# Patient Record
Sex: FEMALE | Race: WHITE | NOT HISPANIC OR LATINO | Employment: FULL TIME | URBAN - METROPOLITAN AREA
[De-identification: names, ages, dates, MRNs, and addresses within clinical notes are randomized per-mention and may not be internally consistent; named-entity substitution may affect disease eponyms.]

---

## 2021-04-05 VITALS — WEIGHT: 150 LBS | HEIGHT: 59 IN | BODY MASS INDEX: 30.24 KG/M2

## 2021-04-05 DIAGNOSIS — Z12.11 ENCOUNTER FOR SCREENING COLONOSCOPY: Primary | ICD-10-CM

## 2021-04-05 DIAGNOSIS — Z83.71 FAMILY HISTORY OF POLYPS IN THE COLON: ICD-10-CM

## 2021-04-05 RX ORDER — ASPIRIN 81 MG/1
81 TABLET ORAL DAILY
COMMUNITY

## 2021-04-05 RX ORDER — SODIUM PICOSULFATE, MAGNESIUM OXIDE, AND ANHYDROUS CITRIC ACID 10; 3.5; 12 MG/160ML; G/160ML; G/160ML
LIQUID ORAL
Qty: 1 BOTTLE | Refills: 0 | Status: SHIPPED | OUTPATIENT
Start: 2021-04-05 | End: 2021-04-09 | Stop reason: HOSPADM

## 2021-04-05 RX ORDER — CITALOPRAM 10 MG/1
10 TABLET ORAL DAILY
COMMUNITY

## 2021-04-05 RX ORDER — MULTIVITAMIN
1 CAPSULE ORAL DAILY
COMMUNITY

## 2021-04-05 RX ORDER — LOSARTAN POTASSIUM 50 MG/1
50 TABLET ORAL DAILY
COMMUNITY

## 2021-04-05 RX ORDER — ATORVASTATIN CALCIUM 10 MG/1
10 TABLET, FILM COATED ORAL DAILY
COMMUNITY

## 2021-04-05 RX ORDER — PNV NO.95/FERROUS FUM/FOLIC AC 28MG-0.8MG
TABLET ORAL DAILY
COMMUNITY

## 2021-04-05 NOTE — TELEPHONE ENCOUNTER
Why does your doctor want you to have this procedure? Fam  Hx polyps    Do you have kidney disease?  no  If yes, are you on dialysis :     Have you had diverticulitis within the past 2 months? no    Are you diabetic?  no  If yes, insulin dependent: If yes, provide diabetic instructions sheet     Do take iron supplements? yes  If yes, instruct patient to hold iron supplement for 7 days prior    Are you on a blood thinner? no   Was the blood thinner sheet complete and faxed to cardiologist no  Plavix (clopidogrel), Coumadin (warfarin), Lovenox (enoxaparin), Xarelto (rivaroxaban), Pradaxa(dabigatran), Eliquis(apixaban) Savaysa/Lixiana (edoxapan)    Do you have an automatic implantable cardiac defibrillator (AICD)/pacemaker ([facility])? no  Was AICD/pacemaker sheet completed and faxed to cardiologist? no    Are you on home oxygen? no  If yes, continuous or nocturnal:     Have you been treated for MRSA, VRE or any communicable diseases? no    Heart attack, stroke, or stent within 3 months? no  Schedule at Hospital if within 3-6 months   Use nitroglycerin for chest pain in the last 6 months? no    History of organ  transplant?  no   If yes, notify Endo      History of neck/throat/tongue surgery or cancer? no  IF yes, notify Endo      Any problems with anesthesia in the past? no     Was stool C diff ordered?  no Stool specimen needs to be completed prior to procedure    Do have any facial or body piercings?no     Do you have a latex allergy? no     Do have an allergy to metals? (Bravo study only) no     If pediatric patient, was consent faxed to pediatrician no     Patient rights reviewed yes    Rx Clenpiq sent to provider for signature  Instructions emailed to patient

## 2021-04-09 ENCOUNTER — ANESTHESIA EVENT (OUTPATIENT)
Dept: GASTROENTEROLOGY | Facility: AMBULATORY SURGERY CENTER | Age: 51
End: 2021-04-09

## 2021-04-09 ENCOUNTER — ANESTHESIA (OUTPATIENT)
Dept: GASTROENTEROLOGY | Facility: AMBULATORY SURGERY CENTER | Age: 51
End: 2021-04-09

## 2021-04-09 ENCOUNTER — HOSPITAL ENCOUNTER (OUTPATIENT)
Dept: GASTROENTEROLOGY | Facility: AMBULATORY SURGERY CENTER | Age: 51
Discharge: HOME | End: 2021-04-09
Payer: COMMERCIAL

## 2021-04-09 VITALS
SYSTOLIC BLOOD PRESSURE: 91 MMHG | HEART RATE: 62 BPM | RESPIRATION RATE: 18 BRPM | DIASTOLIC BLOOD PRESSURE: 68 MMHG | TEMPERATURE: 98.1 F | OXYGEN SATURATION: 100 %

## 2021-04-09 DIAGNOSIS — Z83.71 FAMILY HISTORY OF COLONIC POLYPS: ICD-10-CM

## 2021-04-09 DIAGNOSIS — Z12.11 SCREENING FOR COLON CANCER: ICD-10-CM

## 2021-04-09 PROBLEM — E78.5 HYPERLIPIDEMIA: Status: ACTIVE | Noted: 2021-04-09

## 2021-04-09 PROBLEM — Z87.891 FORMER SMOKER: Status: ACTIVE | Noted: 2021-04-09

## 2021-04-09 PROBLEM — E03.9 HYPOTHYROIDISM: Status: ACTIVE | Noted: 2021-04-09

## 2021-04-09 PROBLEM — I10 HTN (HYPERTENSION): Status: ACTIVE | Noted: 2021-04-09

## 2021-04-09 LAB
EXT PREGNANCY TEST URINE: NEGATIVE
EXT. CONTROL: NORMAL

## 2021-04-09 PROCEDURE — G0105 COLORECTAL SCRN; HI RISK IND: HCPCS | Performed by: INTERNAL MEDICINE

## 2021-04-09 RX ORDER — FENTANYL CITRATE/PF 50 MCG/ML
12.5 SYRINGE (ML) INJECTION
Status: CANCELLED | OUTPATIENT
Start: 2021-04-09

## 2021-04-09 RX ORDER — LABETALOL 20 MG/4 ML (5 MG/ML) INTRAVENOUS SYRINGE
5
Status: CANCELLED | OUTPATIENT
Start: 2021-04-09

## 2021-04-09 RX ORDER — METOCLOPRAMIDE HYDROCHLORIDE 5 MG/ML
10 INJECTION INTRAMUSCULAR; INTRAVENOUS ONCE AS NEEDED
Status: CANCELLED | OUTPATIENT
Start: 2021-04-09

## 2021-04-09 RX ORDER — ONDANSETRON 2 MG/ML
4 INJECTION INTRAMUSCULAR; INTRAVENOUS ONCE AS NEEDED
Status: CANCELLED | OUTPATIENT
Start: 2021-04-09

## 2021-04-09 RX ORDER — SODIUM CHLORIDE 9 MG/ML
50 INJECTION, SOLUTION INTRAVENOUS CONTINUOUS
Status: DISCONTINUED | OUTPATIENT
Start: 2021-04-09 | End: 2021-04-13 | Stop reason: HOSPADM

## 2021-04-09 RX ORDER — HYDRALAZINE HYDROCHLORIDE 20 MG/ML
5 INJECTION INTRAMUSCULAR; INTRAVENOUS
Status: CANCELLED | OUTPATIENT
Start: 2021-04-09

## 2021-04-09 RX ORDER — PROPOFOL 10 MG/ML
INJECTION, EMULSION INTRAVENOUS AS NEEDED
Status: DISCONTINUED | OUTPATIENT
Start: 2021-04-09 | End: 2021-04-09

## 2021-04-09 RX ORDER — PROMETHAZINE HYDROCHLORIDE 25 MG/ML
6.25 INJECTION, SOLUTION INTRAMUSCULAR; INTRAVENOUS ONCE AS NEEDED
Status: CANCELLED | OUTPATIENT
Start: 2021-04-09

## 2021-04-09 RX ORDER — MEPERIDINE HYDROCHLORIDE 50 MG/ML
12.5 INJECTION INTRAMUSCULAR; INTRAVENOUS; SUBCUTANEOUS
Status: CANCELLED | OUTPATIENT
Start: 2021-04-09

## 2021-04-09 RX ADMIN — PROPOFOL 20 MG: 10 INJECTION, EMULSION INTRAVENOUS at 09:48

## 2021-04-09 RX ADMIN — PROPOFOL 20 MG: 10 INJECTION, EMULSION INTRAVENOUS at 09:40

## 2021-04-09 RX ADMIN — PROPOFOL 80 MG: 10 INJECTION, EMULSION INTRAVENOUS at 09:35

## 2021-04-09 RX ADMIN — PROPOFOL 20 MG: 10 INJECTION, EMULSION INTRAVENOUS at 09:51

## 2021-04-09 RX ADMIN — PROPOFOL 20 MG: 10 INJECTION, EMULSION INTRAVENOUS at 09:45

## 2021-04-09 RX ADMIN — PROPOFOL 20 MG: 10 INJECTION, EMULSION INTRAVENOUS at 09:42

## 2021-04-09 RX ADMIN — SODIUM CHLORIDE 50 ML/HR: 9 INJECTION, SOLUTION INTRAVENOUS at 09:23

## 2021-04-09 RX ADMIN — PROPOFOL 20 MG: 10 INJECTION, EMULSION INTRAVENOUS at 09:38

## 2021-04-09 NOTE — ANESTHESIA PREPROCEDURE EVALUATION
Procedure:  COLONOSCOPY    Relevant Problems   ANESTHESIA (within normal limits)      CARDIO   (+) HTN (hypertension)   (+) Hyperlipidemia      ENDO   (+) Hypothyroidism      GI/HEPATIC (within normal limits)      /RENAL (within normal limits)      GYN (within normal limits)      HEMATOLOGY (within normal limits)      MUSCULOSKELETAL (within normal limits)      NEURO/PSYCH (within normal limits)      PULMONARY (within normal limits)      Other   (+) Former smoker        Physical Exam    Airway    Mallampati score: II  TM Distance: >3 FB  Neck ROM: full     Dental   No notable dental hx     Cardiovascular  Rhythm: regular, Rate: normal, Cardiovascular exam normal    Pulmonary  Pulmonary exam normal Breath sounds clear to auscultation,     Other Findings        Anesthesia Plan  ASA Score- 2     Anesthesia Type- IV sedation with anesthesia with ASA Monitors  Additional Monitors:   Airway Plan:           Plan Factors-Exercise tolerance (METS): >4 METS  Chart reviewed  Patient summary reviewed  Patient is not a current smoker  Induction-     Postoperative Plan-     Informed Consent- Anesthetic plan and risks discussed with patient

## 2021-04-09 NOTE — ANESTHESIA POSTPROCEDURE EVALUATION
Post-Op Assessment Note    CV Status:  Stable  Pain Score: 0    Pain management: adequate     Mental Status:  Sleepy   Hydration Status:  Stable   PONV Controlled:  None   Airway Patency:  Patent and adequate      Post Op Vitals Reviewed: Yes      Staff: Anesthesiologist         No complications documented      BP      Temp      Pulse     Resp      SpO2

## 2021-04-09 NOTE — H&P
History and Physical -  [facility]  Jignesh He 49 y o  female MRN: [id number]                  HPI: Jignesh He is a 49y o  year old female who presents for colon cancer screening  Father with colon polyps  REVIEW OF SYSTEMS: Per the HPI, and otherwise unremarkable  Historical Information   Past Medical History:   Diagnosis Date    Hashimoto's disease     Hyperlipidemia     Hypertension      Past Surgical History:   Procedure Laterality Date     SECTION      CHOLECYSTECTOMY      TONSILLECTOMY      TRIGGER FINGER RELEASE Right      Social History   Social History     Substance and Sexual Activity   Alcohol Use Not Currently     Social History     Substance and Sexual Activity   Drug Use Never     Social History     Tobacco Use   Smoking Status Former Smoker   Smokeless Tobacco Never Used     Family History   Problem Relation Age of Onset    Hypertension Mother     Hyperlipidemia Mother     Colon polyps Father     Diabetes Father     Hypertension Father     Hyperlipidemia Father     Colon cancer Neg Hx        Meds/Allergies     Current Outpatient Medications:     aspirin (ECOTRIN LOW STRENGTH) 81 mg EC tablet    atorvastatin (LIPITOR) 10 mg tablet    citalopram (CeleXA) 10 mg tablet    Ferrous Sulfate (Iron) 325 (65 Fe) MG TABS    losartan (COZAAR) 50 mg tablet    Multiple Vitamin (multivitamin) capsule    Sod Picosulfate-Mag Ox-Cit Acd (Clenpiq) 10-3 5-12 MG-GM -GM/160ML SOLN    Current Facility-Administered Medications:     sodium chloride 0 9 % infusion, 50 mL/hr, Intravenous, Continuous, Continue from Pre-op at 21 0926    Allergies   Allergen Reactions    Sulfa Antibiotics Rash       Objective     /68   Pulse 71   Temp 98 1 °F (36 7 °C) (Temporal)   Resp 16   SpO2 96%       PHYSICAL EXAM    Gen: NAD AAOx3  CV: S1S2 RRR no m/r/g  CHEST: Clear b/l no c/r/w  ABD: +BS soft, NT/ND  EXT: no edema      ASSESSMENT/PLAN:  This is a 49 y o  year old female here for colonoscopy, and she is stable and optimized for her procedure

## 2021-04-09 NOTE — DISCHARGE INSTRUCTIONS
Hemorrhoids   WHAT YOU NEED TO KNOW:   What are hemorrhoids? Hemorrhoids are swollen blood vessels inside your rectum (internal hemorrhoids) or on your anus (external hemorrhoids)  Sometimes a hemorrhoid may prolapse  This means it extends out of your anus  What increases my risk for hemorrhoids? · Pregnancy or obesity    · Straining or sitting for a long time during bowel movements    · Liver disease    · Weak muscles around the anus caused by older age, rectal surgery, or anal intercourse    · A lack of physical activity    · Chronic diarrhea or constipation    · A low-fiber diet    What are the signs and symptoms of hemorrhoids? · Pain or itching around your anus or inside your rectum    · Swelling or bumps around your anus    · Bright red blood in your bowel movement, on the toilet paper, or in the toilet bowl    · Tissue bulging out of your anus (prolapsed hemorrhoids)    · Incontinence (poor control over urine or bowel movements)    How are hemorrhoids diagnosed? Your healthcare provider will ask about your symptoms, the foods you eat, and your bowel movements  He or she will examine your anus for external hemorrhoids  You may need the following:  · A digital rectal exam  is a test to check for hemorrhoids  Your healthcare provider will put a gloved finger inside your anus to feel for the hemorrhoids  · An anoscopy  is a test that uses a scope (small tube with a light and camera on the end) to look at your hemorrhoids  How are hemorrhoids treated? Treatment will depend on your symptoms  You may need any of the following:  · Medicines  can help decrease pain and swelling, and soften your bowel movement  The medicine may be a pill, pad, cream, or ointment  · Procedures  may be used to shrink or remove your hemorrhoid  Examples include rubber-band ligation, sclerotherapy, and photocoagulation  These procedures may be done in your healthcare provider's office   Ask your healthcare provider for more information about these procedures  · Surgery  may be needed to shrink or remove your hemorrhoids  How can I manage my symptoms? · Apply ice on your anus for 15 to 20 minutes every hour or as directed  Use an ice pack, or put crushed ice in a plastic bag  Cover it with a towel before you apply it to your anus  Ice helps prevent tissue damage and decreases swelling and pain  · Take a sitz bath  Fill a bathtub with 4 to 6 inches of warm water  You may also use a sitz bath pan that fits inside a toilet bowl  Sit in the sitz bath for 15 minutes  Do this 3 times a day, and after each bowel movement  The warm water can help decrease pain and swelling  · Keep your anal area clean  Gently wash the area with warm water daily  Soap may irritate the area  After a bowel movement, wipe with moist towelettes or wet toilet paper  Dry toilet paper can irritate the area  How can I help prevent hemorrhoids? · Do not strain to have a bowel movement  Do not sit on the toilet too long  These actions can increase pressure on the tissues in your rectum and anus  · Drink plenty of liquids  Liquids can help prevent constipation  Ask how much liquid to drink each day and which liquids are best for you  · Eat a variety of high-fiber foods  Examples include fruits, vegetables, and whole grains  Ask your healthcare provider how much fiber you need each day  You may need to take a fiber supplement  · Exercise as directed  Exercise, such as walking, may make it easier to have a bowel movement  Ask your healthcare provider to help you create an exercise plan  · Do not have anal sex  Anal sex can weaken the skin around your rectum and anus  · Avoid heavy lifting  This can cause straining and increase your risk for another hemorrhoid  When should I seek immediate care? · You have severe pain in your rectum or around your anus      · You have severe pain in your abdomen and you are vomiting  · You have bleeding from your anus that soaks through your underwear  When should I contact my healthcare provider? · You have frequent and painful bowel movements  · Your hemorrhoid looks or feels more swollen than usual      · You do not have a bowel movement for 2 days or more  · You see or feel tissue coming through your anus  · You have questions or concerns about your condition or care  CARE AGREEMENT:   You have the right to help plan your care  Learn about your health condition and how it may be treated  Discuss treatment options with your healthcare providers to decide what care you want to receive  You always have the right to refuse treatment  The above information is an  only  It is not intended as medical advice for individual conditions or treatments  Talk to your doctor, nurse or pharmacist before following any medical regimen to see if it is safe and effective for you  © Copyright [address] Information is for End User's use only and may not be sold, redistributed or otherwise used for commercial purposes   All illustrations and images included in CareNotes® are the copyrighted property of A D A M , Inc  or [address] [place] [address]

## 2021-04-12 DIAGNOSIS — Z23 ENCOUNTER FOR IMMUNIZATION: ICD-10-CM

## 2022-03-22 ENCOUNTER — HOSPITAL ENCOUNTER (OUTPATIENT)
Dept: CT IMAGING | Facility: HOSPITAL | Age: 52
Discharge: HOME | End: 2022-03-22

## 2022-03-22 DIAGNOSIS — I10 ESSENTIAL (PRIMARY) HYPERTENSION: ICD-10-CM

## 2022-03-22 DIAGNOSIS — E78.00 PURE HYPERCHOLESTEROLEMIA, UNSPECIFIED: ICD-10-CM

## 2022-03-22 PROCEDURE — G1004 CDSM NDSC: HCPCS

## 2022-03-22 PROCEDURE — 75571 CT HRT W/O DYE W/CA TEST: CPT

## 2022-04-05 NOTE — PROGRESS NOTES
Randa Moreau is a  52 y.o. No obstetric history on file. for annual exam.  She has irregular menses  with No LMP recorded.    The patient has no complaints   She uses nfp for birth control  She performs self breast exams with no concerns     Review of Systems  Constitutional: Negative  ENMT: Negative  Eyes: Negative  Respiratory: Negative  Cardio: Negative  GI:Negative  :Negative  Neuro: Negative  Psych: Negative  Integumentary: Negative  MS:Negative  Heme/Lymph: Negative  Reproductive: Negative    OB History:   Cs x 2     Medical History/GYN History No past medical history on file.    Surgical History: cs x 2     Social History:   Social History     Socioeconomic History   • Marital status:         Family History: No family history on file.    Allergies: sulfa rash    Prior to Admission medications    Not on File           Objective     Physical Exam   Constitutional: She is oriented to person, place, and time. She appears well-developed and well-nourished.   HEENT:   Head: Normocephalic and atraumatic.   Eyes: EOM are normal. Pupils are equal, round, and reactive to light.   Neck: Normal range of motion. No tracheal deviation present. No thyromegaly present.   Abdominal: Soft. Bowel sounds are normal. She exhibits no distension and no masses. There is no tenderness. There is no rebound and no guarding.   Musculoskeletal: Normal range of motion. She exhibits no edema.   Lymphadenopathy:     She has no cervical adenopathy.   Neurological: She is alert and oriented to person, place, and time. No cranial nerve deficit.   Skin: Skin is warm and dry.   Psychiatric: She has a normal mood and affect.     BREAST: no masses or nodes palpated b/l    PELVIC:  Genitourinary: Vagina normal.   External female genitalia normal.   Normal bladder.   Vagina normal. No vaginal discharge found.   Cervix exam normal.Cervix does not exhibit motion tenderness or discharge. Uterus is normal size . Uterine contour is regular.    Adnexa normal. Right adnexum does not display tenderness, does not display fullness and palpable. Left adnexum does not display tenderness, does not display fullness and palpable.   Nursing note and vitals reviewed.    A/P:  Annual: Pap  Self breast exam taught  Screening mammo annually at 41  Contraceptive counseling  Screening Dexa at 51  Screening Colonoscopy at 46  Cholesterol and TFT screening with Primary MD  Preconception counseling  The following counseling was provided: Diet and Exercise: Smoking Cessation; Drug/Alcohol Abuse/ HIV and Safe Sex/ Domestic Violence/ Vitamin Supplementation   21 yr willi  and ash going to college in 6 mo and janet doing well has ocd pt did colon and mammo neg but due for mammo on gluten free diet has hashimotos but no meds yet  Menses still coming q22 but not ev month skipped 5 mo in past likely perimp  Had endobx last mo and us all neg for vb q15 after covid vaccine.

## 2022-04-07 ENCOUNTER — OFFICE VISIT (OUTPATIENT)
Dept: OBSTETRICS AND GYNECOLOGY | Facility: CLINIC | Age: 52
End: 2022-04-07
Payer: COMMERCIAL

## 2022-04-07 VITALS
SYSTOLIC BLOOD PRESSURE: 116 MMHG | DIASTOLIC BLOOD PRESSURE: 74 MMHG | WEIGHT: 169 LBS | HEIGHT: 59 IN | BODY MASS INDEX: 34.07 KG/M2

## 2022-04-07 DIAGNOSIS — Z01.419 WELL WOMAN EXAM WITH ROUTINE GYNECOLOGICAL EXAM: Primary | ICD-10-CM

## 2022-04-07 PROBLEM — E78.5 HYPERLIPIDEMIA: Status: ACTIVE | Noted: 2021-04-09

## 2022-04-07 PROBLEM — E03.9 HYPOTHYROIDISM: Status: ACTIVE | Noted: 2021-04-09

## 2022-04-07 PROBLEM — I10 HTN (HYPERTENSION): Status: ACTIVE | Noted: 2021-04-09

## 2022-04-07 PROCEDURE — 3074F SYST BP LT 130 MM HG: CPT | Performed by: OBSTETRICS & GYNECOLOGY

## 2022-04-07 PROCEDURE — 99396 PREV VISIT EST AGE 40-64: CPT | Performed by: OBSTETRICS & GYNECOLOGY

## 2022-04-07 PROCEDURE — 3008F BODY MASS INDEX DOCD: CPT | Performed by: OBSTETRICS & GYNECOLOGY

## 2022-04-07 PROCEDURE — 3078F DIAST BP <80 MM HG: CPT | Performed by: OBSTETRICS & GYNECOLOGY

## 2022-04-07 RX ORDER — ATORVASTATIN CALCIUM 10 MG/1
10 TABLET, FILM COATED ORAL DAILY
COMMUNITY
End: 2024-03-21

## 2022-04-07 RX ORDER — LOSARTAN POTASSIUM 25 MG/1
25 TABLET ORAL NIGHTLY
COMMUNITY

## 2022-04-07 RX ORDER — SPIRONOLACTONE 50 MG/1
TABLET, FILM COATED ORAL
COMMUNITY
Start: 2022-01-27 | End: 2024-03-21

## 2022-04-07 RX ORDER — ASPIRIN 81 MG/1
81 TABLET ORAL DAILY
COMMUNITY
End: 2024-03-21

## 2022-04-07 RX ORDER — FERROUS SULFATE 325(65) MG
65 TABLET ORAL DAILY
COMMUNITY

## 2022-04-12 LAB
CYTOLOGIST CVX/VAG CYTO: NORMAL
CYTOLOGY CVX/VAG DOC CYTO: NORMAL
CYTOLOGY CVX/VAG DOC THIN PREP: NORMAL
DX ICD CODE: NORMAL
HPV I/H RISK 4 DNA CVX QL PROBE+SIG AMP: NEGATIVE
LAB CORP NOTE:: NORMAL
OTHER STN SPEC: NORMAL
STAT OF ADQ CVX/VAG CYTO-IMP: NORMAL

## 2023-05-09 ENCOUNTER — OFFICE VISIT (OUTPATIENT)
Dept: OBSTETRICS AND GYNECOLOGY | Facility: CLINIC | Age: 53
End: 2023-05-09
Payer: COMMERCIAL

## 2023-05-09 VITALS
DIASTOLIC BLOOD PRESSURE: 71 MMHG | HEIGHT: 59 IN | SYSTOLIC BLOOD PRESSURE: 120 MMHG | BODY MASS INDEX: 30.92 KG/M2 | WEIGHT: 153.4 LBS

## 2023-05-09 DIAGNOSIS — Z01.419 WELL WOMAN EXAM WITH ROUTINE GYNECOLOGICAL EXAM: Primary | ICD-10-CM

## 2023-05-09 DIAGNOSIS — N63.0 MASS OF BREAST, UNSPECIFIED LATERALITY: ICD-10-CM

## 2023-05-09 DIAGNOSIS — Z12.31 VISIT FOR SCREENING MAMMOGRAM: ICD-10-CM

## 2023-05-09 PROCEDURE — 3078F DIAST BP <80 MM HG: CPT | Performed by: OBSTETRICS & GYNECOLOGY

## 2023-05-09 PROCEDURE — 99396 PREV VISIT EST AGE 40-64: CPT | Performed by: OBSTETRICS & GYNECOLOGY

## 2023-05-09 PROCEDURE — 3074F SYST BP LT 130 MM HG: CPT | Performed by: OBSTETRICS & GYNECOLOGY

## 2023-05-09 PROCEDURE — 3008F BODY MASS INDEX DOCD: CPT | Performed by: OBSTETRICS & GYNECOLOGY

## 2023-05-09 RX ORDER — METOPROLOL TARTRATE 50 MG/1
50 TABLET ORAL 2 TIMES DAILY
Qty: 180 TABLET | Refills: 1 | Status: SHIPPED | OUTPATIENT
Start: 2023-05-09 | End: 2024-03-21

## 2023-05-09 RX ORDER — ACETAMINOPHEN 500 MG
TABLET ORAL
COMMUNITY
End: 2024-04-04

## 2023-05-09 RX ORDER — ZINC GLUCONATE 50 MG
500 TABLET ORAL
COMMUNITY
End: 2024-12-06 | Stop reason: ALTCHOICE

## 2023-05-09 NOTE — PROGRESS NOTES
Randa Moreau is a  53 y.o.  for annual exam.  She has irregular menses  with No LMP recorded.    The patient has no complaints   She uses nfp for birth control  She performs self breast exams with no concerns     Review of Systems  Constitutional: Negative  ENMT: Negative  Eyes: Negative  Respiratory: Negative  Cardio: Negative  GI:Negative  :Negative  Neuro: Negative  Psych: Negative  Integumentary: Negative  MS:Negative  Heme/Lymph: Negative  Reproductive: Negative    OB History:   OB History    Para Term  AB Living   3 2 2 0 1 2   SAB IAB Ectopic Multiple Live Births   1 0 0 0 2      # Outcome Date GA Lbr Alon/2nd Weight Sex Delivery Anes PTL Lv   3 Term 05    F CS-LTranv   KATHIE   2 Term 04    M CS-LTranv   KATHIE   1 SAB                Medical History/GYN History   Past Medical History:   Diagnosis Date   • Hypercholesteremia    • Hypertension    • Hypothyroidism    • PCOS (polycystic ovarian syndrome)        Surgical History:   Past Surgical History:   Procedure Laterality Date   •  SECTION     • CHOLECYSTECTOMY     • TONSILLECTOMY         Social History:   Social History     Socioeconomic History   • Marital status:      Spouse name: None   • Number of children: None   • Years of education: None   • Highest education level: None   Tobacco Use   • Smoking status: Former   • Smokeless tobacco: Never   Vaping Use   • Vaping status: Never Used   Substance and Sexual Activity   • Alcohol use: Yes     Comment: social   • Drug use: Never   • Sexual activity: Yes     Partners: Male        Family History:   Family History   Problem Relation Age of Onset   • Diabetes Biological Father    • Hypertension Biological Father    • Thyroid disease Biological Father    • Hypertension Biological Mother    • Hyperlipidemia Biological Mother    • Breast cancer Father's Sister    • Colon cancer Neg Hx        Allergies: Sulfa (sulfonamide antibiotics)    Prior to Admission medications     Medication Sig Start Date End Date Taking? Authorizing Provider   aspirin 81 mg enteric coated tablet Take 81 mg by mouth daily.    Erik King MD   atorvastatin (LIPITOR) 10 mg tablet Take 10 mg by mouth daily.    Erik King MD   cholecalciferol, vitamin D3, 50 mcg (2,000 unit) capsule 1 tablet Orally qod    Anshul King MD   ferrous sulfate 325 mg (65 mg iron) tablet Take by mouth daily.    Erik King MD   fsh/flx/prim/cur/bor/om3,6,9 5 (OMEGA 3-6-9 FATTY ACIDS ORAL) as directed Orally    Anshul King MD   iron,carb/vit C/vit B12/folic (IRON 100 PLUS ORAL) 1 tab Oral    Anshul King MD   losartan (COZAAR) 50 mg tablet Take 50 mg by mouth daily.    Erik King MD   magnesium oxide 500 mg capsule daily.    Anshul King MD   MOUNJARO 7.5 mg/0.5 mL pen injector USE AS DIRECTED SUBCUTANEOUSLY ONCE A WEEK FOR 28 DAYS 4/15/23   Anshul King MD   spironolactone (ALDACTONE) 50 mg tablet  1/27/22   Erik King MD           Objective     Physical Exam   Constitutional: She is oriented to person, place, and time. She appears well-developed and well-nourished.   HEENT:   Head: Normocephalic and atraumatic.   Eyes: EOM are normal. Pupils are equal, round, and reactive to light.   Neck: Normal range of motion. No tracheal deviation present. No thyromegaly present.   Abdominal: Soft. Bowel sounds are normal. She exhibits no distension and no masses. There is no tenderness. There is no rebound and no guarding.   Musculoskeletal: Normal range of motion. She exhibits no edema.   Lymphadenopathy:     She has no cervical adenopathy.   Neurological: She is alert and oriented to person, place, and time. No cranial nerve deficit.   Skin: Skin is warm and dry.   Psychiatric: She has a normal mood and affect.     BREAST: no masses or nodes palpated b/l    PELVIC:  Genitourinary: Vagina normal.   External female genitalia  normal.   Normal bladder.   Vagina normal. No vaginal discharge found.   Cervix exam normal.Cervix does not exhibit motion tenderness or discharge. Uterus is normal size . Uterine contour is regular.   Adnexa normal. Right adnexum does not display tenderness, does not display fullness and palpable. Left adnexum does not display tenderness, does not display fullness and palpable.   Nursing note and vitals reviewed.    A/P: still getting menses monthly on injectables for wt loss   20+ yr willi aleman in [facility] with her brother there too  janet going to [employer] for college  Now chol better off meds mom and dad live close  Due for mammo  Cystic in [place]  Will get us there too  metoprolol for shakes palp  From injectables.  Works and travels all the time Annual: Pap done  Self breast exam taught  Screening mammo annually at 41  Contraceptive counseling  Screening Dexa at 66 or 11 yr   Screening Colonoscopy at 45 to 50  Cholesterol and TFT screening with Primary MD  Preconception counseling as needed  The following counseling was provided: Diet and Exercise: Smoking Cessation; Drug/Alcohol Abuse/ HIV and Safe Sex/ Domestic Violence/ Vitamin Supplementation vit d 1-2000 daily   Gardisil advised for pt if under 27 and not completed series as of visit

## 2023-06-14 DIAGNOSIS — N63.0 MASS OF BREAST, UNSPECIFIED LATERALITY: Primary | ICD-10-CM

## 2023-06-14 NOTE — PROGRESS NOTES
Patient called in stating she tried to schedule U/S at [facility] but was told she needs rx for mammogram to be changed to diagnostic    New order emailed to patient   
(3) no apparent problem

## 2024-03-21 ENCOUNTER — OFFICE VISIT (OUTPATIENT)
Dept: OBSTETRICS AND GYNECOLOGY | Facility: CLINIC | Age: 54
End: 2024-03-21
Payer: COMMERCIAL

## 2024-03-21 VITALS
OXYGEN SATURATION: 98 % | HEART RATE: 75 BPM | SYSTOLIC BLOOD PRESSURE: 120 MMHG | BODY MASS INDEX: 28.39 KG/M2 | DIASTOLIC BLOOD PRESSURE: 82 MMHG | HEIGHT: 59 IN | WEIGHT: 140.8 LBS

## 2024-03-21 DIAGNOSIS — N93.9 ABNORMAL UTERINE BLEEDING (AUB): Primary | ICD-10-CM

## 2024-03-21 DIAGNOSIS — Z12.31 SCREENING MAMMOGRAM FOR BREAST CANCER: ICD-10-CM

## 2024-03-21 PROCEDURE — 99999 PR OFFICE/OUTPT VISIT,PROCEDURE ONLY: CPT | Performed by: OBSTETRICS & GYNECOLOGY

## 2024-03-21 PROCEDURE — 58100 BIOPSY OF UTERUS LINING: CPT | Performed by: OBSTETRICS & GYNECOLOGY

## 2024-03-21 RX ORDER — METOPROLOL SUCCINATE 50 MG/1
25 TABLET, EXTENDED RELEASE ORAL DAILY
COMMUNITY
Start: 2024-02-14

## 2024-03-21 NOTE — PROCEDURES
Endometrial Ablation    Date/Time: 3/21/2024 3:01 PM    Performed by: Sindy Raza DO  Authorized by: Sindy Raza DO    Consent:     Consent obtained:  Verbal    Consent given by:  Patient    Procedural risks discussed:  Bleeding, infection, failure rate, death, damage to other organs and repeat procedure    Patient questions answered: yes      Patient agrees, verbalizes understanding, and wants to proceed: yes    Indication:     Indications: Excessive or frequent menstruation and Other disorder of menstruation and other abnormal bleeding from female genital tract    Pre-procedure:    Local anesthetic:     Total amount used (mL):  0  Procedure:     Cervix cleaned and prepped: yes      Tenaculum applied to cervix: yes      Uterus sound depth (cm):  8    Cervix dilated: yes    Post-procedure:     No complications: no      Estimated blood loss (mL):  0  Comments:      No complications.

## 2024-03-21 NOTE — PROGRESS NOTES
Patient ID: Randa Moreau   : 1970 54 y.o.  MRN: [id number]   Visit Date: 2024    Subjective   Randa Moreau is presenting today for Biopsy      HPI  No LMP recorded.      Pt here for AUB. Pt still getting menses but states she has been bleeding light for the past 2 weeks. Pt admits to some bloating but denies pelvic pain.         Past Medical History:  has a past medical history of Hypercholesteremia, Hypertension, Hypothyroidism, and PCOS (polycystic ovarian syndrome).     Past Surgical History:  has a past surgical history that includes  section; Tonsillectomy; and Cholecystectomy.    Obstetric History:   OB History    Para Term  AB Living   3 2 2   1 2   SAB IAB Ectopic Multiple Live Births   1       2      # Outcome Date GA Lbr Alon/2nd Weight Sex Delivery Anes PTL Lv   3 Term 05    F CS-LTranv   KATHIE   2 Term 04    M CS-LTranv   KATHIE   1 SAB                Family History: family history includes Breast cancer in her father's sister; Diabetes in her biological father; Hyperlipidemia in her biological mother; Hypertension in her biological father and biological mother; Thyroid disease in her biological father.    Medications:   Current Outpatient Medications:   •  cholecalciferol, vitamin D3, 50 mcg (2,000 unit) capsule, 1 tablet Orally qod, Disp: , Rfl:   •  ferrous sulfate 325 mg (65 mg iron) tablet, Take by mouth daily., Disp: , Rfl:   •  fsh/flx/prim/cur/bor/om3,6,9 5 (OMEGA 3-6-9 FATTY ACIDS ORAL), as directed Orally, Disp: , Rfl:   •  iron,carb/vit C/vit B12/folic (IRON 100 PLUS ORAL), 1 tab Oral, Disp: , Rfl:   •  losartan (COZAAR) 25 mg tablet, Take 25 mg by mouth daily., Disp: , Rfl:   •  magnesium oxide 500 mg capsule, daily., Disp: , Rfl:   •  metoprolol succinate XL (TOPROL-XL) 50 mg 24 hr tablet, , Disp: , Rfl:   •  tirzepatide (MOUNJARO) 12.5 mg/0.5 mL pen injector, , Disp: , Rfl:   •  aspirin 81 mg enteric coated tablet, Take 81 mg by mouth daily.,  "Disp: , Rfl:   •  atorvastatin (LIPITOR) 10 mg tablet, Take 10 mg by mouth daily., Disp: , Rfl:   •  metoprolol tartrate (LOPRESSOR) 50 mg tablet, Take 1 tablet (50 mg total) by mouth 2 (two) times a day., Disp: 180 tablet, Rfl: 1  •  spironolactone (ALDACTONE) 50 mg tablet, , Disp: , Rfl:       Allergies: is allergic to lisinopril and sulfa (sulfonamide antibiotics).       Vital Signs for this encounter:   Visit Vitals  /82 (BP Location: Right upper arm, Patient Position: Sitting)   Pulse 75   Ht 1.499 m (4' 11\")   Wt 63.9 kg (140 lb 12.8 oz)   SpO2 98%   BMI 28.44 kg/m²       Review of Systems    General Appearance: Alert, cooperative, no acute distress  Head: Normocephalic, without obvious abnormality  Breast: Deferred  Abdomen: Soft, nontender, nondistended,no masses, no organomegaly  Pelvic exam: VULVA: normal appearing vulva with no masses, tenderness or lesions, VAGINA: normal appearing vagina with normal color and discharge, no lesions, CERVIX: scant blood in vault normal appearing cervix without discharge or lesions, UTERUS: uterus is normal size, shape, consistency and nontender, ADNEXA: normal adnexa in size, nontender and no masses.  Extremities: no edema      Impression/Plan:    54 y.o. is presenting today for Biopsy    1. Abnormal uterine bleeding (AUB)  Pt tolerated procedure well. Will send for pelvic ultrasound and will call patient with results.   - Biopsy endometrial  - BI SCREENING MAMMOGRAM BILATERAL(TOMOSYNTHESIS); Future  - US PELVIS TRANSABDOMINAL & TRANSVAGINAL; Future  - Pathology Tissue Exam    2. Screening mammogram for breast cancer    - Biopsy endometrial  - BI SCREENING MAMMOGRAM BILATERAL(TOMOSYNTHESIS); Future  - US PELVIS TRANSABDOMINAL & TRANSVAGINAL; Future  - Pathology Tissue Exam             Sindy Raza, DO  "

## 2024-03-28 ENCOUNTER — TELEPHONE (OUTPATIENT)
Dept: OBSTETRICS AND GYNECOLOGY | Facility: CLINIC | Age: 54
End: 2024-03-28
Payer: COMMERCIAL

## 2024-03-28 DIAGNOSIS — N93.9 ABNORMAL UTERINE BLEEDING (AUB): ICD-10-CM

## 2024-03-28 DIAGNOSIS — Z12.31 SCREENING MAMMOGRAM FOR BREAST CANCER: ICD-10-CM

## 2024-03-28 LAB
DX ICD CODE: NORMAL
DX ICD CODE: NORMAL
PATH REPORT.COMMENTS IMP SPEC: NORMAL
PATH REPORT.FINAL DX SPEC: NORMAL
PATH REPORT.GROSS SPEC: NORMAL
PATH REPORT.SITE OF ORIGIN SPEC: NORMAL
PATHOLOGIST NAME: NORMAL
PAYMENT PROCEDURE: NORMAL

## 2024-03-28 NOTE — TELEPHONE ENCOUNTER
Patient contacted me to let me see the results of her ultrasound and is asking for her pathology results her ultrasound shows a possible fundal polyp up to 1 cm I explained this would likely need to be removed through a D&C hysteroscopy we will get the actual report onto the chart and await her pathology from her office endometrial biopsy and then make a decision after that

## 2024-04-02 ENCOUNTER — TELEPHONE (OUTPATIENT)
Dept: OBSTETRICS AND GYNECOLOGY | Facility: CLINIC | Age: 54
End: 2024-04-02
Payer: COMMERCIAL

## 2024-04-04 ENCOUNTER — PRE-ADMISSION TESTING (OUTPATIENT)
Dept: PREADMISSION TESTING | Age: 54
End: 2024-04-04
Payer: COMMERCIAL

## 2024-04-04 ENCOUNTER — PREP FOR CASE (OUTPATIENT)
Dept: OBSTETRICS AND GYNECOLOGY | Facility: CLINIC | Age: 54
End: 2024-04-04
Payer: COMMERCIAL

## 2024-04-04 ENCOUNTER — DOCUMENTATION (OUTPATIENT)
Dept: OBSTETRICS AND GYNECOLOGY | Facility: CLINIC | Age: 54
End: 2024-04-04
Payer: COMMERCIAL

## 2024-04-04 VITALS — WEIGHT: 135 LBS | HEIGHT: 59 IN | BODY MASS INDEX: 27.21 KG/M2

## 2024-04-04 DIAGNOSIS — Z01.818 ENCOUNTER FOR PREADMISSION TESTING: Primary | ICD-10-CM

## 2024-04-04 DIAGNOSIS — N93.9 ABNORMAL UTERINE BLEEDING (AUB): Primary | ICD-10-CM

## 2024-04-04 RX ORDER — ATORVASTATIN CALCIUM 10 MG/1
10 TABLET, FILM COATED ORAL DAILY
COMMUNITY

## 2024-04-04 RX ORDER — SODIUM CHLORIDE 9 MG/ML
INJECTION, SOLUTION INTRAVENOUS CONTINUOUS
Status: CANCELLED | OUTPATIENT
Start: 2024-04-04 | End: 2024-04-11

## 2024-04-04 NOTE — PROGRESS NOTES
Call from patient regarding upcoming hysteroscopy polyp removal potentially scheduled for 04/10.   Patient asks if procedure is done 04/10, can she still fly to [place] the next day, 04/11.   Advised this is okay per Dr Perry.

## 2024-04-04 NOTE — PRE-PROCEDURE INSTRUCTIONS
1.       Admissions will call you with your arrival time on  April 9, 2024 (day prior to surgery) between 2pm-4pm.  For questions about your arrival time, please call [phone].    2.       On the day of your procedure please report to the [facility] in the Gate. Please arrive through the [place] [facility].  If you are parking in the [place] [facility], come to the ground floor of the garage and follow signs to the [facility].  After being screened, please report to either the Outpatient Registration for Pre-Admission Testing or to the Surgery Registration Desk.    3. Please follow the following fasting guidelines:     No solid food EIGHT HOURS prior to arrival time on day of surgery.  6 ounces of clear liquids, meaning water or PLAIN black coffee WITHOUT any milk, cream, sugar, or sweetener are permitted up to TWO HOURS prior to arrival at the hospital.    4. Please take ONLY the following medications with a sip of water on the morning of your procedure:     None    Stop Mounjaro for 1 week prior to surgery. (Last dose 3/31).  Stop all over the counter herbs, supplements, vitamins, minerals.  Stop all NSAIDs, Aspirin products. Tylenol OK.      5. Other Instructions: You may brush your teeth the morning of the procedure. Rinse and spit, do not swallow.  Bring a list of your medications with dosages.  Use surgical wash as directed.     6. If you develop a cold, cough, fever, rash, or other symptom prior to the day of the procedure, please report it to your physician immediately.    7. If you need to cancel the procedure for any reason, please contact your physician.    8. Make arrangements to have safe transportation home accompanied by a responsible adult. If you have not arranged safe transportation home, your surgery will be cancelled. Safe transportation may include private vehicle, ride-share service, taxi and public transportation when accompanied by a responsible adult who will  assist you home. A responsible adult is someone known to you and does not include the taxi, ride-share or public transit drive transporting you.    9.  If it is medically necessary for you to have a longer stay, you will be informed as soon as the decision is made.    10. Only bring essential items to the hospital.  Do not wear or bring anything of value to the hospital including jewelry of any kind, money, or wallet. Do not wear make-up or contact lenses.  DO NOT BRING MEDICATIONS FROM HOME unless instructed to do so. DO bring your hearing aids, glasses, and a case    11. No lotion, creams, powders, or oils on skin the morning of procedure     12. Dress in comfortable clothes.    13.  If instructed, please bring a copy of your Advanced Directive (Living Will/Durable Power of ) on the day of your procedure.     14. Ensuring your safety at all times is a very important part of our [facility] Culture of Safety. After having surgery and sedation, you are at risk for falling and balance issues. Although you may feel awake, the effects of the medication can last up to 24 hours after anesthesia. If you need to use the bathroom during your recovery period, nursing staff will escort you there and stay with you to ensure your safety.    15. Refrain from drinking alcohol and smoking cigarettes for 24 hours prior to surgery.    16. Shower with antibacterial soap (Dial) the night before and morning of your procedure.  If your procedure indicates the need for CHG antiseptic wash (Bactoshield or Hibiclens), please use this instead and follow instructions as discussed at the time of your Pre-Admission Testing phone interview or visit.    Above instructions reviewed with patient and patient acknowledges understanding.    Form explained by: Magdalena Tavares RN

## 2024-04-08 ENCOUNTER — APPOINTMENT (OUTPATIENT)
Dept: LAB | Facility: HOSPITAL | Age: 54
End: 2024-04-08
Attending: OBSTETRICS & GYNECOLOGY
Payer: COMMERCIAL

## 2024-04-08 ENCOUNTER — TRANSCRIBE ORDERS (OUTPATIENT)
Dept: PREADMISSION TESTING | Facility: HOSPITAL | Age: 54
End: 2024-04-08

## 2024-04-08 ENCOUNTER — ANESTHESIA EVENT (OUTPATIENT)
Dept: OPERATING ROOM | Facility: HOSPITAL | Age: 54
Setting detail: HOSPITAL OUTPATIENT SURGERY
End: 2024-04-08
Payer: COMMERCIAL

## 2024-04-08 ENCOUNTER — HOSPITAL ENCOUNTER (OUTPATIENT)
Dept: CARDIOLOGY | Facility: HOSPITAL | Age: 54
Discharge: HOME | End: 2024-04-08
Attending: OBSTETRICS & GYNECOLOGY
Payer: COMMERCIAL

## 2024-04-08 DIAGNOSIS — N93.9 ABNORMAL UTERINE AND VAGINAL BLEEDING, UNSPECIFIED: ICD-10-CM

## 2024-04-08 DIAGNOSIS — N93.9 ABNORMAL UTERINE AND VAGINAL BLEEDING, UNSPECIFIED: Primary | ICD-10-CM

## 2024-04-08 DIAGNOSIS — Z01.818 ENCOUNTER FOR PREADMISSION TESTING: ICD-10-CM

## 2024-04-08 DIAGNOSIS — N93.9 ABNORMAL UTERINE BLEEDING (AUB): ICD-10-CM

## 2024-04-08 LAB
ABO + RH BLD: NORMAL
ANION GAP SERPL CALC-SCNC: 9 MEQ/L (ref 3–15)
ATRIAL RATE: 69
BLD GP AB SCN SERPL QL: NEGATIVE
BLOOD BANK CMNT PATIENT-IMP: NORMAL
BUN SERPL-MCNC: 13 MG/DL (ref 7–25)
CALCIUM SERPL-MCNC: 9.7 MG/DL (ref 8.6–10.3)
CHLORIDE SERPL-SCNC: 104 MEQ/L (ref 98–107)
CO2 SERPL-SCNC: 24 MEQ/L (ref 21–31)
CREAT SERPL-MCNC: 0.8 MG/DL (ref 0.6–1.2)
D AG BLD QL: POSITIVE
EGFRCR SERPLBLD CKD-EPI 2021: >60 ML/MIN/1.73M*2
ERYTHROCYTE [DISTWIDTH] IN BLOOD BY AUTOMATED COUNT: 13.1 % (ref 11.7–14.4)
GLUCOSE SERPL-MCNC: 89 MG/DL (ref 70–99)
HCG UR QL: NEGATIVE
HCT VFR BLD AUTO: 46.2 % (ref 35–45)
HGB BLD-MCNC: 14.7 G/DL (ref 11.8–15.7)
LABORATORY COMMENT REPORT: NORMAL
MCH RBC QN AUTO: 27 PG (ref 28–33.2)
MCHC RBC AUTO-ENTMCNC: 31.8 G/DL (ref 32.2–35.5)
MCV RBC AUTO: 84.8 FL (ref 83–98)
P AXIS: 45
PDW BLD AUTO: 11.5 FL (ref 9.4–12.3)
PLATELET # BLD AUTO: 376 K/UL (ref 150–369)
POTASSIUM SERPL-SCNC: 4 MEQ/L (ref 3.5–5.1)
PR INTERVAL: 140
QRS DURATION: 76
QT INTERVAL: 410
QTC CALCULATION(BAZETT): 439
R AXIS: 18
RBC # BLD AUTO: 5.45 M/UL (ref 3.93–5.22)
SODIUM SERPL-SCNC: 137 MEQ/L (ref 136–145)
SPECIMEN EXP DATE BLD: NORMAL
T WAVE AXIS: 62
VENTRICULAR RATE: 69
WBC # BLD AUTO: 7.33 K/UL (ref 3.8–10.5)

## 2024-04-08 PROCEDURE — 85027 COMPLETE CBC AUTOMATED: CPT

## 2024-04-08 PROCEDURE — 93005 ELECTROCARDIOGRAM TRACING: CPT | Performed by: OBSTETRICS & GYNECOLOGY

## 2024-04-08 PROCEDURE — 86850 RBC ANTIBODY SCREEN: CPT

## 2024-04-08 PROCEDURE — 86901 BLOOD TYPING SEROLOGIC RH(D): CPT

## 2024-04-08 PROCEDURE — 84703 CHORIONIC GONADOTROPIN ASSAY: CPT

## 2024-04-08 PROCEDURE — 93010 ELECTROCARDIOGRAM REPORT: CPT | Performed by: INTERNAL MEDICINE

## 2024-04-08 PROCEDURE — 80048 BASIC METABOLIC PNL TOTAL CA: CPT

## 2024-04-08 PROCEDURE — 36415 COLL VENOUS BLD VENIPUNCTURE: CPT

## 2024-04-10 ENCOUNTER — HOSPITAL ENCOUNTER (OUTPATIENT)
Facility: HOSPITAL | Age: 54
Setting detail: HOSPITAL OUTPATIENT SURGERY
Discharge: HOME | End: 2024-04-10
Attending: OBSTETRICS & GYNECOLOGY | Admitting: OBSTETRICS & GYNECOLOGY
Payer: COMMERCIAL

## 2024-04-10 ENCOUNTER — ANESTHESIA (OUTPATIENT)
Dept: OPERATING ROOM | Facility: HOSPITAL | Age: 54
Setting detail: HOSPITAL OUTPATIENT SURGERY
End: 2024-04-10
Payer: COMMERCIAL

## 2024-04-10 VITALS
WEIGHT: 135 LBS | RESPIRATION RATE: 14 BRPM | BODY MASS INDEX: 27.21 KG/M2 | TEMPERATURE: 97.1 F | DIASTOLIC BLOOD PRESSURE: 77 MMHG | HEART RATE: 71 BPM | OXYGEN SATURATION: 99 % | HEIGHT: 59 IN | SYSTOLIC BLOOD PRESSURE: 111 MMHG

## 2024-04-10 DIAGNOSIS — N93.9 ABNORMAL UTERINE BLEEDING (AUB): ICD-10-CM

## 2024-04-10 LAB
ABO + RH BLD: NORMAL
B-HCG UR QL: NEGATIVE
D AG BLD QL: POSITIVE
LABORATORY COMMENT REPORT: NORMAL
POCT TEST: NORMAL

## 2024-04-10 PROCEDURE — 71000012 HC PACU PHASE 2 EA ADDL MIN: Performed by: OBSTETRICS & GYNECOLOGY

## 2024-04-10 PROCEDURE — 36415 COLL VENOUS BLD VENIPUNCTURE: CPT | Performed by: OBSTETRICS & GYNECOLOGY

## 2024-04-10 PROCEDURE — 0U5B8ZZ DESTRUCTION OF ENDOMETRIUM, VIA NATURAL OR ARTIFICIAL OPENING ENDOSCOPIC: ICD-10-PCS | Performed by: OBSTETRICS & GYNECOLOGY

## 2024-04-10 PROCEDURE — 71000011 HC PACU PHASE 1 EA ADDL MIN: Performed by: OBSTETRICS & GYNECOLOGY

## 2024-04-10 PROCEDURE — 63600000 HC DRUGS/DETAIL CODE: Mod: JW | Performed by: NURSE ANESTHETIST, CERTIFIED REGISTERED

## 2024-04-10 PROCEDURE — 88305 TISSUE EXAM BY PATHOLOGIST: CPT | Performed by: OBSTETRICS & GYNECOLOGY

## 2024-04-10 PROCEDURE — 0UDB8ZX EXTRACTION OF ENDOMETRIUM, VIA NATURAL OR ARTIFICIAL OPENING ENDOSCOPIC, DIAGNOSTIC: ICD-10-PCS | Performed by: OBSTETRICS & GYNECOLOGY

## 2024-04-10 PROCEDURE — 71000002 HC PACU PHASE 2 INITIAL 30MIN: Performed by: OBSTETRICS & GYNECOLOGY

## 2024-04-10 PROCEDURE — 25000000 HC PHARMACY GENERAL: Performed by: NURSE ANESTHETIST, CERTIFIED REGISTERED

## 2024-04-10 PROCEDURE — 63600000 HC DRUGS/DETAIL CODE: Mod: JZ | Performed by: NURSE ANESTHETIST, CERTIFIED REGISTERED

## 2024-04-10 PROCEDURE — 58558 HYSTEROSCOPY BIOPSY: CPT | Performed by: OBSTETRICS & GYNECOLOGY

## 2024-04-10 PROCEDURE — 36000012 HC OR LEVEL 2 EA ADDL MIN: Performed by: OBSTETRICS & GYNECOLOGY

## 2024-04-10 PROCEDURE — 37000001 HC ANESTHESIA GENERAL: Performed by: OBSTETRICS & GYNECOLOGY

## 2024-04-10 PROCEDURE — 71000001 HC PACU PHASE 1 INITIAL 30MIN: Performed by: OBSTETRICS & GYNECOLOGY

## 2024-04-10 PROCEDURE — 25800000 HC PHARMACY IV SOLUTIONS: Performed by: OBSTETRICS & GYNECOLOGY

## 2024-04-10 PROCEDURE — 36000002 HC OR LEVEL 2 INITIAL 30MIN: Performed by: OBSTETRICS & GYNECOLOGY

## 2024-04-10 RX ORDER — ONDANSETRON HYDROCHLORIDE 2 MG/ML
4 INJECTION, SOLUTION INTRAVENOUS
Status: DISCONTINUED | OUTPATIENT
Start: 2024-04-10 | End: 2024-04-10 | Stop reason: HOSPADM

## 2024-04-10 RX ORDER — DEXTROSE 40 %
15-30 GEL (GRAM) ORAL AS NEEDED
Status: DISCONTINUED | OUTPATIENT
Start: 2024-04-10 | End: 2024-04-10 | Stop reason: HOSPADM

## 2024-04-10 RX ORDER — DIPHENHYDRAMINE HCL 50 MG/ML
12.5 VIAL (ML) INJECTION
Status: DISCONTINUED | OUTPATIENT
Start: 2024-04-10 | End: 2024-04-10 | Stop reason: HOSPADM

## 2024-04-10 RX ORDER — DEXAMETHASONE SODIUM PHOSPHATE 4 MG/ML
INJECTION, SOLUTION INTRA-ARTICULAR; INTRALESIONAL; INTRAMUSCULAR; INTRAVENOUS; SOFT TISSUE AS NEEDED
Status: DISCONTINUED | OUTPATIENT
Start: 2024-04-10 | End: 2024-04-10 | Stop reason: SURG

## 2024-04-10 RX ORDER — EPHEDRINE SULFATE/0.9% NACL/PF 50 MG/5 ML
SYRINGE (ML) INTRAVENOUS AS NEEDED
Status: DISCONTINUED | OUTPATIENT
Start: 2024-04-10 | End: 2024-04-10 | Stop reason: SURG

## 2024-04-10 RX ORDER — DEXTROSE 50 % IN WATER (D50W) INTRAVENOUS SYRINGE
25 AS NEEDED
Status: DISCONTINUED | OUTPATIENT
Start: 2024-04-10 | End: 2024-04-10 | Stop reason: HOSPADM

## 2024-04-10 RX ORDER — MIDAZOLAM HYDROCHLORIDE 2 MG/2ML
INJECTION, SOLUTION INTRAMUSCULAR; INTRAVENOUS AS NEEDED
Status: DISCONTINUED | OUTPATIENT
Start: 2024-04-10 | End: 2024-04-10 | Stop reason: SURG

## 2024-04-10 RX ORDER — PROPOFOL 10 MG/ML
INJECTION, EMULSION INTRAVENOUS AS NEEDED
Status: DISCONTINUED | OUTPATIENT
Start: 2024-04-10 | End: 2024-04-10 | Stop reason: SURG

## 2024-04-10 RX ORDER — METOCLOPRAMIDE HYDROCHLORIDE 5 MG/ML
10 INJECTION INTRAMUSCULAR; INTRAVENOUS
Status: DISCONTINUED | OUTPATIENT
Start: 2024-04-10 | End: 2024-04-10 | Stop reason: HOSPADM

## 2024-04-10 RX ORDER — HYDROMORPHONE HYDROCHLORIDE 1 MG/ML
0.5 INJECTION, SOLUTION INTRAMUSCULAR; INTRAVENOUS; SUBCUTANEOUS
Status: DISCONTINUED | OUTPATIENT
Start: 2024-04-10 | End: 2024-04-10 | Stop reason: HOSPADM

## 2024-04-10 RX ORDER — SODIUM CHLORIDE 9 MG/ML
INJECTION, SOLUTION INTRAVENOUS CONTINUOUS
Status: DISCONTINUED | OUTPATIENT
Start: 2024-04-10 | End: 2024-04-10 | Stop reason: HOSPADM

## 2024-04-10 RX ORDER — IBUPROFEN 200 MG
16-32 TABLET ORAL AS NEEDED
Status: DISCONTINUED | OUTPATIENT
Start: 2024-04-10 | End: 2024-04-10 | Stop reason: HOSPADM

## 2024-04-10 RX ORDER — ONDANSETRON HYDROCHLORIDE 2 MG/ML
INJECTION, SOLUTION INTRAVENOUS AS NEEDED
Status: DISCONTINUED | OUTPATIENT
Start: 2024-04-10 | End: 2024-04-10 | Stop reason: SURG

## 2024-04-10 RX ORDER — MEPERIDINE HYDROCHLORIDE 25 MG/ML
12.5 INJECTION INTRAMUSCULAR; INTRAVENOUS; SUBCUTANEOUS EVERY 10 MIN PRN
Status: DISCONTINUED | OUTPATIENT
Start: 2024-04-10 | End: 2024-04-10 | Stop reason: HOSPADM

## 2024-04-10 RX ORDER — PHENYLEPHRINE HCL IN 0.9% NACL 1 MG/10 ML
SYRINGE (ML) INTRAVENOUS AS NEEDED
Status: DISCONTINUED | OUTPATIENT
Start: 2024-04-10 | End: 2024-04-10 | Stop reason: SURG

## 2024-04-10 RX ORDER — MIDAZOLAM HYDROCHLORIDE 2 MG/2ML
1 INJECTION, SOLUTION INTRAMUSCULAR; INTRAVENOUS AS NEEDED
Status: DISCONTINUED | OUTPATIENT
Start: 2024-04-10 | End: 2024-04-10 | Stop reason: HOSPADM

## 2024-04-10 RX ORDER — FENTANYL CITRATE 50 UG/ML
50 INJECTION, SOLUTION INTRAMUSCULAR; INTRAVENOUS EVERY 5 MIN PRN
Status: DISCONTINUED | OUTPATIENT
Start: 2024-04-10 | End: 2024-04-10 | Stop reason: HOSPADM

## 2024-04-10 RX ORDER — KETOROLAC TROMETHAMINE 15 MG/ML
INJECTION, SOLUTION INTRAMUSCULAR; INTRAVENOUS AS NEEDED
Status: DISCONTINUED | OUTPATIENT
Start: 2024-04-10 | End: 2024-04-10 | Stop reason: SURG

## 2024-04-10 RX ORDER — FENTANYL CITRATE 50 UG/ML
INJECTION, SOLUTION INTRAMUSCULAR; INTRAVENOUS AS NEEDED
Status: DISCONTINUED | OUTPATIENT
Start: 2024-04-10 | End: 2024-04-10 | Stop reason: SURG

## 2024-04-10 RX ORDER — LIDOCAINE HYDROCHLORIDE 10 MG/ML
INJECTION, SOLUTION EPIDURAL; INFILTRATION; INTRACAUDAL; PERINEURAL AS NEEDED
Status: DISCONTINUED | OUTPATIENT
Start: 2024-04-10 | End: 2024-04-10 | Stop reason: SURG

## 2024-04-10 RX ADMIN — SODIUM CHLORIDE: 9 INJECTION, SOLUTION INTRAVENOUS at 12:11

## 2024-04-10 RX ADMIN — Medication 10 MG: at 13:17

## 2024-04-10 RX ADMIN — Medication 100 MCG: at 13:09

## 2024-04-10 RX ADMIN — Medication 100 MCG: at 13:15

## 2024-04-10 RX ADMIN — Medication 10 MG: at 13:19

## 2024-04-10 RX ADMIN — Medication 10 MG: at 13:27

## 2024-04-10 RX ADMIN — KETOROLAC TROMETHAMINE 15 MG: 15 INJECTION, SOLUTION INTRAMUSCULAR; INTRAVENOUS at 13:46

## 2024-04-10 RX ADMIN — FENTANYL CITRATE 50 MCG: 50 INJECTION INTRAMUSCULAR; INTRAVENOUS at 13:07

## 2024-04-10 RX ADMIN — Medication 5 ML: at 13:07

## 2024-04-10 RX ADMIN — FENTANYL CITRATE 25 MCG: 50 INJECTION INTRAMUSCULAR; INTRAVENOUS at 13:24

## 2024-04-10 RX ADMIN — Medication 10 MG: at 13:34

## 2024-04-10 RX ADMIN — ONDANSETRON 4 MG: 2 INJECTION INTRAMUSCULAR; INTRAVENOUS at 13:14

## 2024-04-10 RX ADMIN — PROPOFOL 150 MG: 10 INJECTION, EMULSION INTRAVENOUS at 13:07

## 2024-04-10 RX ADMIN — MIDAZOLAM HYDROCHLORIDE 2 MG: 1 INJECTION, SOLUTION INTRAMUSCULAR; INTRAVENOUS at 12:59

## 2024-04-10 RX ADMIN — DEXAMETHASONE SODIUM PHOSPHATE 4 MG: 4 INJECTION, SOLUTION INTRA-ARTICULAR; INTRALESIONAL; INTRAMUSCULAR; INTRAVENOUS; SOFT TISSUE at 13:14

## 2024-04-10 ASSESSMENT — PAIN SCALES - GENERAL: PAIN_LEVEL: 1

## 2024-04-10 NOTE — OP NOTE
D&C, HYSTEROSCOPY/RESECTOSCOPY MYOMECTOMY MYOSURE/TRUCLEAR Procedure Note     Procedure:    D&C, HYSTEROSCOPY/RESECTOSCOPY MYOMECTOMY MYOSURE/TRUCLEAR  CPT(R) Code:  65162 - AR HYSTEROSCOPY,RMV MYOMA    Indications: The patient has a history of AUB and suspected endometrial polyp on ultrasound. The patient now presents for surgery after discussing therapeutic alternatives.          Pre-op Diagnosis     * Abnormal uterine bleeding (AUB) [N93.9]    Post-op Diagnosis     * Abnormal uterine bleeding (AUB) [N93.9]    Surgeon: Sindy Raza DO     Assistants: none    Anesthesia: General LMA anesthesia    ASA Class: 2      Procedure Details    The patient was taken to the operating room and placed in the dorsal supine position, wherein she was placed under general anesthesia and subsequently intubated. She was then placed in the dorsal lithotomy position. She was then prepped and draped in the usual sterile fashion and placed in a slight degree of Trendelenburg. The patient was examined under anesthesia with notation of a relatively normal anteverted uterus, smooth and mobile. There were no adnexal masses.  The speculum was then placed into the vagina, thereby exposing the cervix. It was grasped on its anterior lip with a single-tooth tenaculum. The uterus was sounded to 8 cm. The cervical os was then serially dilated to a sufficient size to allow passage of the hysteroscope. The passage of hysteroscope and the cervical canal showed a small suspected uterine polyp located anteriorly. However, in the endometrium, there did appear to proliferative endometrium. The ostia were visualized on either side. The trueclear device was then inserted and carefully some of the proliferative endometrium and suspected uterine poly was removed and sent to pathology. Then the trueclear device was removed from the device and the patient. The hysteroscope was then removed. Then using the sharp curettage, endometrial curettings were sent to  pathology as a separate specimen.   At the completion, all instruments were removed. The single-tooth tenaculum was removed. Hemostasis was achieved by pressure. The speculum was removed. The patient was subsequently returned to the dorsal supine position. Anesthesia was subsequently reversed. The patient was then extubated and taken to the recovery room in stable condition.    Findings:  Suspected polyp in endometrial canal located anterioly and fluffy/proliferative endometrium located throughout. Deficit 180cc.     Estimated Blood Loss:  2 mL           Drains: none           Total IV Fluids: Crystalloid            Specimens:   ID Type Source Tests Collected by Time Destination   1 : Uterine mass Tissue Uterus PATHOLOGY TISSUE EXAM Sindy Raza DO 4/10/2024 7489    2 : Endometrial currettings Tissue Uterus PATHOLOGY TISSUE EXAM Sindy Raaz DO 4/10/2024 6007               Complications:  none           Disposition: PACU - hemodynamically stable.           Condition: stable    Sindy Raza DO  Phone Number: [phone]

## 2024-04-10 NOTE — ANESTHESIA PROCEDURE NOTES
Airway  Urgency: elective    Start Time: 4/10/2024 1:08 PM  Airway not difficult    General Information and Staff    Patient location during procedure: OR  Anesthesiologist: Param Andrews MD  Resident/CRNA: Tracy Poe CRNA  Performed: other anesthesia staff   Performed by: Tracy Poe CRNA  Authorized by: Param Andrews MD      Indications and Patient Condition  Indications for airway management: anesthesia  Sedation level: deep  Preoxygenated: yes  Patient position: sniffing  MILS maintained throughout  Mask difficulty assessment: 0 - not attempted    Final Airway Details  Final airway type: supraglottic airway      Successful airway: iGel  Size 3     Number of attempts at approach: 1  Number of other approaches attempted: 0  Atraumatic airway insertion

## 2024-04-10 NOTE — ANESTHESIA PREPROCEDURE EVALUATION
Relevant Problems   CARDIOVASCULAR   (+) HTN (hypertension)      Other   (+) Hypothyroidism       Anesthesia ROS/MED HX      Endo/Other  Body Habitus: Normal       Past Surgical History:   Procedure Laterality Date     SECTION      CHOLECYSTECTOMY      TONSILLECTOMY         Physical Exam    Airway   Mallampati: II   TM distance: >3 FB   Neck ROM: full  Cardiovascular - normal   Rhythm: regular   Rate: normalPulmonary - normal   clear to auscultation  Dental - normal        Anesthesia Plan    Plan: general    Technique: general LMA      2 ASA  Anesthetic plan and risks discussed with: patient  Postop Plan:   Pain Management: IV analgesics

## 2024-04-10 NOTE — ANESTHESIOLOGIST PRE-PROCEDURE ATTESTATION
Pre-Procedure Patient Identification:  I am the Primary Anesthesiologist and have identified the patient on 04/10/24 at 11:53 AM.   I have confirmed the procedure(s) will be performed by the following surgeon/proceduralist Sindy Raza DO.

## 2024-04-10 NOTE — H&P
Gynecology History and Physical    HPI     Patient is a 54 y.o. female who presents with AUB and polyp on EMB. Pt is here today for hysteroscopy and trueclear removal of mass with D&C after counseling.      OB History:   OB History    Para Term  AB Living   3 2 2 0 1 2   SAB IAB Ectopic Multiple Live Births   1 0 0 0 2      # Outcome Date GA Lbr Alon/2nd Weight Sex Delivery Anes PTL Lv   3 Term 05    F CS-LTranv   KATHIE   2 Term 04    M CS-LTranv   KATHIE   1 SAB                Medical History:   Past Medical History:   Diagnosis Date    Hypercholesteremia     Hypertension     Hypothyroidism     hashimotos    PCOS (polycystic ovarian syndrome)        Surgical History:   Past Surgical History:   Procedure Laterality Date     SECTION      CHOLECYSTECTOMY      TONSILLECTOMY         Social History:   Social History     Social History Narrative    Not on file       Family History:   Family History   Problem Relation Age of Onset    Hypertension Biological Mother     Hyperlipidemia Biological Mother     Diabetes Biological Father     Hypertension Biological Father     Thyroid disease Biological Father     Breast cancer Father's Sister     Colon cancer Neg Hx        Allergies: Lisinopril and Sulfa (sulfonamide antibiotics)    Prior to Admission medications    Medication Sig Start Date End Date Taking? Authorizing Provider   atorvastatin (LIPITOR) 10 mg tablet Take 10 mg by mouth daily.   Yes ProviderAnshul MD   ferrous sulfate 325 mg (65 mg iron) tablet Take 65 mg by mouth daily.   Yes ProviderErik MD   fsh/flx/prim/cur/bor/om3,6,9 5 (OMEGA 3-6-9 FATTY ACIDS ORAL) Take 1 tablet by mouth daily.   Yes Anshul King MD   losartan (COZAAR) 25 mg tablet Take 25 mg by mouth nightly.   Yes Erik King MD   magnesium oxide 500 mg capsule Take 500 mg by mouth daily.   Yes Anshul King MD   metoprolol succinate XL (TOPROL-XL) 50 mg 24 hr tablet Take  50 mg by mouth nightly. 2/14/24  Yes Provider, Anshul Valencia MD   tirzepatide (MOUNJARO) 12.5 mg/0.5 mL pen injector Inject 12.5 mg under the skin every (seven) 7 days. Jose Angel 4/15/23   Provider, Anshul Valencia MD       Review of Systems  Pertinent items are noted in HPI.    Objective     Vital Signs for the last 24 hours:  Temp:  [36.6 °C (97.8 °F)] 36.6 °C (97.8 °F)  Heart Rate:  [73] 73  Resp:  [16] 16  BP: (104)/(69) 104/69    Exam  General Appearance: Alert, cooperative, no acute distress  Head: Normocephalic, without obvious abnormality, atraumatic  Abdomen: Soft, nontender, nondistended, bowel sounds active all four quadrants,  no masses, no organomegaly  Rectal: Deferred  Extremities: no edema or calf tenderness  Neurologic: Deferred    Labs  No new labs.    Imaging  I have reviewed the Imaging from the last 24 hrs.    Pt here with AUB and suspected endometrial polyp for hysteroscopy and removal with D&C  Reviewed risks of procedure with patient - consent in chart. Admit labs. Anesthesia. Will proceed to OR when ready     Assessment/Plan     Code Status: Full Code

## 2024-04-10 NOTE — DISCHARGE SUMMARY
Inpatient Discharge Summary    BRIEF OVERVIEW  Admitting Provider: Sindy Raza DO  Discharge Provider: Sindy Raza DO  Primary Care Physician at Discharge: Samantha Stokes [phone]     Admission Date: 4/10/2024     Discharge Date: 4/10/2024    Primary Discharge Diagnosis  Abnormal uterine bleeding (AUB)    Secondary Discharge Diagnosis  Same + uterine mass    Discharge Disposition  Home     Discharge Medications     Medication List        CONTINUE taking these medications      atorvastatin 10 mg tablet  Commonly known as: LIPITOR  Take 10 mg by mouth daily.  Dose: 10 mg     ferrous sulfate 325 mg (65 mg iron) tablet  Take 65 mg by mouth daily.  Dose: 65 mg     losartan 25 mg tablet  Commonly known as: COZAAR  Take 25 mg by mouth nightly.  Dose: 25 mg     magnesium oxide 500 mg capsule  Take 500 mg by mouth daily.  Dose: 500 mg     metoprolol succinate XL 50 mg 24 hr tablet  Commonly known as: TOPROL-XL  Take 50 mg by mouth nightly.  Dose: 50 mg     OMEGA 3-6-9 FATTY ACIDS ORAL  Take 1 tablet by mouth daily.  Dose: 1 tablet     tirzepatide 12.5 mg/0.5 mL pen injector  Commonly known as: MOUNJARO  Inject 12.5 mg under the skin every (seven) 7 days. Sunday  Dose: 12.5 mg              Active Issues Requiring Follow-up  Issue: post op appt   Responsible Individual: patient  What is Needed: 2 week appt   Follow-up Appointments Arranged: No     Outpatient Follow-Up  Encounter Information    This patient does not currently have any appointments scheduled.         Test Results Pending at Discharge  Unresulted Labs (From admission, onward)       Start     Ordered    04/10/24 8215  Pathology Tissue Exam  RELEASE UPON ORDERING        Comments: Specimen 1: Pre-op diagnosis:  Abnormal uterine bleeding (AUB) [N93.9]    Specimen 2: Pre-op diagnosis:  Abnormal uterine bleeding (AUB) [N93.9]     Question:  Release to patient  Answer:  Immediate    04/10/24 7360                    DETAILS OF HOSPITAL STAY    Presenting  Problem/History of Present Illness  Abnormal uterine bleeding (AUB) [N93.9]    Hospital Course/Operative Procedures Performed  Procedure(s):  D&C, HYSTEROSCOPY/RESECTOSCOPY MYOMECTOMY MYOSURE/TRUCLEAR

## 2024-04-10 NOTE — OR SURGEON
Pre-Procedure patient identification:  I am the primary operating surgeon/proceduralist and I have reviewed the applicable pathology reports and radiology studies for this procedure. I have identified the patient on 04/10/24 at 12:45 PM Sindy Raza DO  Phone Number: [phone]

## 2024-04-10 NOTE — ANESTHESIA POSTPROCEDURE EVALUATION
Patient: Randa Moreau    Procedure Summary       Date: 04/10/24 Room / Location: [facility] OR  / [facility] OR [facility]    Anesthesia Start: 1300 Anesthesia Stop: 1356    Procedure: D&C, HYSTEROSCOPY/RESECTOSCOPY MYOMECTOMY MYOSURE/TRUCLEAR Diagnosis:       Abnormal uterine bleeding (AUB)      (Abnormal uterine bleeding (AUB) [N93.9])    Surgeons: Sindy Raza DO Responsible Provider: Param Andrews MD    Anesthesia Type: general ASA Status: 2            Anesthesia Type: general  PACU Vitals  4/10/2024 1347 - 4/10/2024 1422        4/10/2024  1352 4/10/2024  1400          BP: 115/68 104/62      Temp: 36.2 °C (97.2 °F) 36.1 °C (96.9 °F)      Pulse: 90 90      Resp: 18 26      SpO2: 100 % 100 %                Anesthesia Post Evaluation    Pain score: 1  Pain management: adequate  Patient location during evaluation: PACU  Patient participation: complete - patient participated  Level of consciousness: awake and alert  Cardiovascular status: acceptable  Airway Patency: adequate  Respiratory status: acceptable  Hydration status: acceptable  Anesthetic complications: no

## 2024-04-16 LAB
CASE RPRT: NORMAL
CLINICAL INFO: NORMAL
PATH REPORT.FINAL DX SPEC: NORMAL
PATH REPORT.FINAL DX SPEC: NORMAL
PATH REPORT.GROSS SPEC: NORMAL

## 2024-04-26 ENCOUNTER — TELEPHONE (OUTPATIENT)
Dept: OBSTETRICS AND GYNECOLOGY | Facility: CLINIC | Age: 54
End: 2024-04-26
Payer: COMMERCIAL

## 2024-05-08 DIAGNOSIS — Z12.31 SCREENING MAMMOGRAM FOR BREAST CANCER: ICD-10-CM

## 2024-05-08 DIAGNOSIS — N93.9 ABNORMAL UTERINE BLEEDING (AUB): ICD-10-CM

## 2024-07-11 NOTE — PROGRESS NOTES
Randa Moreau is a  54 y.o.  for annual exam.  She has irregular menses  with No LMP recorded.    The patient has no complaints   She uses na for birth control  She performs self breast exams with no concerns     Review of Systems  Constitutional: Negative  ENMT: Negative  Eyes: Negative  Respiratory: Negative  Cardio: Negative  GI:Negative  :Negative  Neuro: Negative  Psych: Negative  Integumentary: Negative  MS:Negative  Heme/Lymph: Negative  Reproductive: Negative    OB History:   OB History    Para Term  AB Living   3 2 2 0 1 2   SAB IAB Ectopic Multiple Live Births   1 0 0 0 2      # Outcome Date GA Lbr Alon/2nd Weight Sex Delivery Anes PTL Lv   3 Term 05    F CS-LTranv   KATHIE   2 Term 04    M CS-LTranv   KATHIE   1 SAB                Medical History/GYN History   Past Medical History:   Diagnosis Date    Hypercholesteremia     Hypertension     Hypothyroidism     hashimotos    PCOS (polycystic ovarian syndrome)        Surgical History:   Past Surgical History:   Procedure Laterality Date     SECTION      CHOLECYSTECTOMY      TONSILLECTOMY         Social History:   Social History     Socioeconomic History    Marital status:    Tobacco Use    Smoking status: Former     Packs/day: 0.13     Years: 15.00     Additional pack years: 0.00     Total pack years: 1.95     Types: Cigarettes     Quit date:      Years since quittin.5    Smokeless tobacco: Never   Vaping Use    Vaping Use: Never used   Substance and Sexual Activity    Alcohol use: Yes     Comment: social    Drug use: Never    Sexual activity: Yes     Partners: Male        Family History:   Family History   Problem Relation Age of Onset    Hypertension Biological Mother     Hyperlipidemia Biological Mother     Diabetes Biological Father     Hypertension Biological Father     Thyroid disease Biological Father     Breast cancer Father's Sister     Colon cancer Neg Hx        Allergies: Lisinopril and Sulfa  (sulfonamide antibiotics)    Prior to Admission medications    Medication Sig Start Date End Date Taking? Authorizing Provider   atorvastatin (LIPITOR) 10 mg tablet Take 10 mg by mouth daily.    Anshul King MD   ferrous sulfate 325 mg (65 mg iron) tablet Take 65 mg by mouth daily.    Erik King MD   fsh/flx/prim/cur/bor/om3,6,9 5 (OMEGA 3-6-9 FATTY ACIDS ORAL) Take 1 tablet by mouth daily.    Anshul King MD   losartan (COZAAR) 25 mg tablet Take 25 mg by mouth nightly.    Erik King MD   magnesium oxide 500 mg capsule Take 500 mg by mouth daily.    Anshul King MD   metoprolol succinate XL (TOPROL-XL) 50 mg 24 hr tablet Take 50 mg by mouth nightly. 2/14/24   Anshul King MD   tirzepatide (MOUNJARO) 12.5 mg/0.5 mL pen injector Inject 12.5 mg under the skin every (seven) 7 days. Jose Angel 4/15/23   Anshul King MD           Objective     Physical Exam   Constitutional: She is oriented to person, place, and time. She appears well-developed and well-nourished.   HEENT:   Head: Normocephalic and atraumatic.   Eyes: EOM are normal. Pupils are equal, round, and reactive to light.   Neck: Normal range of motion. No tracheal deviation present. No thyromegaly present.   Abdominal: Soft. Bowel sounds are normal. She exhibits no distension and no masses. There is no tenderness. There is no rebound and no guarding.   Musculoskeletal: Normal range of motion. She exhibits no edema.   Lymphadenopathy:     She has no cervical adenopathy.   Neurological: She is alert and oriented to person, place, and time. No cranial nerve deficit.   Skin: Skin is warm and dry.   Psychiatric: She has a normal mood and affect.     BREAST: no masses or nodes palpated b/l    PELVIC:  This patient's pelvic exam was performed with 2 lubricated and gloved fingers inside the vagina to palpate the cervix uterus and ovaries while using the other hand to gently palpate the  top of the uterus with pressure on the abdomen noting the organ size shape and or any abnormality  Genitourinary: Vagina normal.   External female genitalia normal.   Normal bladder.   Vagina normal. No vaginal discharge found.   Cervix exam normal.Cervix does not exhibit motion tenderness or discharge. Uterus is normal size . Uterine contour is regular.   Adnexa normal. Right adnexum does not display tenderness, does not display fullness and palpable. Left adnexum does not display tenderness, does not display fullness and palpable.   Nursing note and vitals reviewed.    A/P:cat b  d and c in 24 neg path colon 21 patient is not having cycles for months she only had 1 cycle since her D&C she did her mammogram and is not due yet for her colonoscopy she does have some family history of colon cancer so she will repeat this in less than 5 years she is doing well and that pounds and is lost over 30 pounds and will continue  Annual: Pap neg in22  Self breast exam taught  Screening mammo annually at 40  Contraceptive counseling  Screening Dexa at 65 or 10 yr   Screening Colonoscopy at 45 to 50  Cholesterol and TFT screening with Primary MD  Preconception counseling as needed  The following counseling was provided: Diet and Exercise: Smoking Cessation; Drug/Alcohol Abuse/ HIV and Safe Sex/ Domestic Violence/ Vitamin Supplementation vit d 1-2000 daily   Gardisil advised for pt if under 27 and not completed series as of visit

## 2024-07-12 ENCOUNTER — OFFICE VISIT (OUTPATIENT)
Dept: OBSTETRICS AND GYNECOLOGY | Facility: CLINIC | Age: 54
End: 2024-07-12
Payer: COMMERCIAL

## 2024-07-12 VITALS
BODY MASS INDEX: 27.82 KG/M2 | SYSTOLIC BLOOD PRESSURE: 110 MMHG | DIASTOLIC BLOOD PRESSURE: 72 MMHG | HEIGHT: 59 IN | WEIGHT: 138 LBS

## 2024-07-12 DIAGNOSIS — Z01.419 WELL WOMAN EXAM WITH ROUTINE GYNECOLOGICAL EXAM: Primary | ICD-10-CM

## 2024-07-12 DIAGNOSIS — Z12.31 VISIT FOR SCREENING MAMMOGRAM: ICD-10-CM

## 2024-07-12 PROCEDURE — 99396 PREV VISIT EST AGE 40-64: CPT | Performed by: OBSTETRICS & GYNECOLOGY

## 2024-07-12 PROCEDURE — 3078F DIAST BP <80 MM HG: CPT | Performed by: OBSTETRICS & GYNECOLOGY

## 2024-07-12 PROCEDURE — 3074F SYST BP LT 130 MM HG: CPT | Performed by: OBSTETRICS & GYNECOLOGY

## 2024-07-12 PROCEDURE — 3008F BODY MASS INDEX DOCD: CPT | Performed by: OBSTETRICS & GYNECOLOGY

## 2024-10-10 ENCOUNTER — DOCUMENTATION (OUTPATIENT)
Dept: OBSTETRICS AND GYNECOLOGY | Facility: CLINIC | Age: 54
End: 2024-10-10
Payer: COMMERCIAL

## 2024-10-10 NOTE — PROGRESS NOTES
Call from patient.     She advised:  [place]-menopausal  Just had a period after a few months which lasted for 2 weeks  Had D&C in summer  Growth in vaginal area for several days    I advised:  Warm compress to area on labia  Come to office to be seen and to discuss menopausal s/s

## 2024-10-23 ENCOUNTER — OFFICE VISIT (OUTPATIENT)
Dept: OBSTETRICS AND GYNECOLOGY | Facility: CLINIC | Age: 54
End: 2024-10-23
Payer: COMMERCIAL

## 2024-10-23 ENCOUNTER — PREP FOR CASE (OUTPATIENT)
Dept: OBSTETRICS AND GYNECOLOGY | Facility: CLINIC | Age: 54
End: 2024-10-23
Payer: COMMERCIAL

## 2024-10-23 VITALS
HEIGHT: 59 IN | WEIGHT: 138 LBS | SYSTOLIC BLOOD PRESSURE: 114 MMHG | BODY MASS INDEX: 27.82 KG/M2 | DIASTOLIC BLOOD PRESSURE: 70 MMHG

## 2024-10-23 DIAGNOSIS — N92.6 IRREGULAR MENSTRUAL BLEEDING: Primary | ICD-10-CM

## 2024-10-23 DIAGNOSIS — N93.9 ABNORMAL UTERINE BLEEDING (AUB): Primary | ICD-10-CM

## 2024-10-23 DIAGNOSIS — D21.9 FIBROIDS: ICD-10-CM

## 2024-10-23 DIAGNOSIS — N93.9 ABNORMAL UTERINE BLEEDING (AUB): ICD-10-CM

## 2024-10-23 DIAGNOSIS — N92.4 PREMENOPAUSAL MENORRHAGIA: ICD-10-CM

## 2024-10-23 PROCEDURE — 99214 OFFICE O/P EST MOD 30 MIN: CPT | Performed by: OBSTETRICS & GYNECOLOGY

## 2024-10-23 PROCEDURE — 3078F DIAST BP <80 MM HG: CPT | Performed by: OBSTETRICS & GYNECOLOGY

## 2024-10-23 PROCEDURE — 3074F SYST BP LT 130 MM HG: CPT | Performed by: OBSTETRICS & GYNECOLOGY

## 2024-10-23 PROCEDURE — 3008F BODY MASS INDEX DOCD: CPT | Performed by: OBSTETRICS & GYNECOLOGY

## 2024-10-23 RX ORDER — ACETAMINOPHEN 325 MG/1
975 TABLET ORAL ONCE
Status: CANCELLED | OUTPATIENT
Start: 2024-10-23 | End: 2024-10-23

## 2024-10-23 RX ORDER — SODIUM CHLORIDE, SODIUM LACTATE, POTASSIUM CHLORIDE, CALCIUM CHLORIDE 600; 310; 30; 20 MG/100ML; MG/100ML; MG/100ML; MG/100ML
INJECTION, SOLUTION INTRAVENOUS CONTINUOUS
Status: CANCELLED | OUTPATIENT
Start: 2024-10-23 | End: 2024-10-30

## 2024-10-23 NOTE — H&P
Patient ID: Randa Moreau   : 1970 54 y.o.  MRN: [id number]   Visit Date: 10/22/2024    Subjective   Randa Moreau is presenting today for No chief complaint on file.      HPI  Patient is here for persistent vaginal bleeding although she had 3 months after her D&C with no.  She is clearly now perimenopausal and bleeding on and off uncontrollably for over 20 days intermittently she has a lot of traveling coming up and she would like to consider an ablation she is also suffering from perimenopausal symptoms hot flashes night sweats and mood swings      Past Medical History:  has a past medical history of Hypercholesteremia, Hypertension, Hypothyroidism, and PCOS (polycystic ovarian syndrome).     Past Surgical History:  has a past surgical history that includes  section; Tonsillectomy; Cholecystectomy; and Dilation and curettage of uterus.    Obstetric History:   OB History    Para Term  AB Living   3 2 2   1 2   SAB IAB Ectopic Multiple Live Births   1       2      # Outcome Date GA Lbr Alon/2nd Weight Sex Type Anes PTL Lv   3 Term 05    F CS-LTranv   KATHIE   2 Term 04    M CS-LTranv   KATHIE   1 SAB                Medications:   Current Outpatient Medications:     atorvastatin (LIPITOR) 10 mg tablet, Take 10 mg by mouth daily., Disp: , Rfl:     ferrous sulfate 325 mg (65 mg iron) tablet, Take 65 mg by mouth daily., Disp: , Rfl:     fsh/flx/prim/cur/bor/om3,6,9 5 (OMEGA 3-6-9 FATTY ACIDS ORAL), Take 1 tablet by mouth daily., Disp: , Rfl:     losartan (COZAAR) 25 mg tablet, Take 25 mg by mouth nightly., Disp: , Rfl:     magnesium oxide 500 mg capsule, Take 500 mg by mouth daily., Disp: , Rfl:     metoprolol succinate XL (TOPROL-XL) 50 mg 24 hr tablet, Take 50 mg by mouth nightly., Disp: , Rfl:     tirzepatide (MOUNJARO) 12.5 mg/0.5 mL pen injector, Inject 12.5 mg under the skin every (seven) 7 days. , Disp: , Rfl:       Allergies: is allergic to lisinopril and sulfa  (sulfonamide antibiotics).       Vital Signs for this encounter: There were no vitals taken for this visit.    Physical Exam   Constitutional: She is oriented to person, place, and time. She appears well-developed and well-nourished.   HEENT: Head: Normocephalic and atraumatic.   Eyes: EOM are normal. Pupils are equal, round, and reactive to light.   Neck: Normal range of motion. No tracheal deviation present. No thyromegaly present.   Abdominal: Soft. Bowel sounds are normal. She exhibits no distension and no masses. There is no tenderness. There is no rebound and no guarding.   Musculoskeletal: Normal range of motion. She exhibits no edema.   Lymphadenopathy: She has no cervical adenopathy.   Neurological: She is alert and oriented to person, place, and time. No cranial nerve deficit.   Skin: Skin is warm and dry.   Psychiatric: She has a normal mood and affect.     BREAST: no masses or nodes palpated b/l    PELVIC:  Genitourinary: Vagina normal.   External female genitalia normal.   Normal bladder.   Vagina normal. No vaginal discharge found.   Cervix exam normal.Cervix does not exhibit motion tenderness or discharge. Uterus is normal size . Uterine contour is regular.   Adnexa normal. Right adnexum does not display tenderness, does not display fullness and palpable. Left adnexum does not display tenderness, does not display fullness and palpable.   Nursing note and vitals reviewed.    Impression/Plan:  Her exam is unremarkable and she should do well with an ablation we discussed the risks and benefits of an ablation and she would like to also consider hormone replacement therapy her D&C was done in April she had negative pathology she had an ultrasound so she should be cleared to proceed with an ablation and then if the pathology is negative and she does well we can consider hormone replacement therapy I will look into getting her on the schedule as soon as possible I have reviewed her prior surgical op notes  and her prior ultrasound          LSH

## 2024-10-23 NOTE — PROGRESS NOTES
Patient ID: Randa Moreau   : 1970 54 y.o.  MRN: [id number]   Visit Date: 10/22/2024    Subjective   Randa Moreau is presenting today for No chief complaint on file.      HPI  Patient is here for persistent vaginal bleeding although she had 3 months after her D&C with no.  She is clearly now perimenopausal and bleeding on and off uncontrollably for over 20 days intermittently she has a lot of traveling coming up and she would like to consider an ablation she is also suffering from perimenopausal symptoms hot flashes night sweats and mood swings      Past Medical History:  has a past medical history of Hypercholesteremia, Hypertension, Hypothyroidism, and PCOS (polycystic ovarian syndrome).     Past Surgical History:  has a past surgical history that includes  section; Tonsillectomy; Cholecystectomy; and Dilation and curettage of uterus.    Obstetric History:   OB History    Para Term  AB Living   3 2 2   1 2   SAB IAB Ectopic Multiple Live Births   1       2      # Outcome Date GA Lbr Alon/2nd Weight Sex Type Anes PTL Lv   3 Term 05    F CS-LTranv   KATHIE   2 Term 04    M CS-LTranv   KATHIE   1 SAB                Medications:   Current Outpatient Medications:     atorvastatin (LIPITOR) 10 mg tablet, Take 10 mg by mouth daily., Disp: , Rfl:     ferrous sulfate 325 mg (65 mg iron) tablet, Take 65 mg by mouth daily., Disp: , Rfl:     fsh/flx/prim/cur/bor/om3,6,9 5 (OMEGA 3-6-9 FATTY ACIDS ORAL), Take 1 tablet by mouth daily., Disp: , Rfl:     losartan (COZAAR) 25 mg tablet, Take 25 mg by mouth nightly., Disp: , Rfl:     magnesium oxide 500 mg capsule, Take 500 mg by mouth daily., Disp: , Rfl:     metoprolol succinate XL (TOPROL-XL) 50 mg 24 hr tablet, Take 50 mg by mouth nightly., Disp: , Rfl:     tirzepatide (MOUNJARO) 12.5 mg/0.5 mL pen injector, Inject 12.5 mg under the skin every (seven) 7 days. , Disp: , Rfl:       Allergies: is allergic to lisinopril and sulfa  (sulfonamide antibiotics).       Vital Signs for this encounter: There were no vitals taken for this visit.    Physical Exam   Constitutional: She is oriented to person, place, and time. She appears well-developed and well-nourished.   HEENT: Head: Normocephalic and atraumatic.   Eyes: EOM are normal. Pupils are equal, round, and reactive to light.   Neck: Normal range of motion. No tracheal deviation present. No thyromegaly present.   Abdominal: Soft. Bowel sounds are normal. She exhibits no distension and no masses. There is no tenderness. There is no rebound and no guarding.   Musculoskeletal: Normal range of motion. She exhibits no edema.   Lymphadenopathy: She has no cervical adenopathy.   Neurological: She is alert and oriented to person, place, and time. No cranial nerve deficit.   Skin: Skin is warm and dry.   Psychiatric: She has a normal mood and affect.     BREAST: no masses or nodes palpated b/l    PELVIC:  Genitourinary: Vagina normal.   External female genitalia normal.   Normal bladder.   Vagina normal. No vaginal discharge found.   Cervix exam normal.Cervix does not exhibit motion tenderness or discharge. Uterus is normal size . Uterine contour is regular.   Adnexa normal. Right adnexum does not display tenderness, does not display fullness and palpable. Left adnexum does not display tenderness, does not display fullness and palpable.   Nursing note and vitals reviewed.    Impression/Plan:  Her exam is unremarkable and she should do well with an ablation we discussed the risks and benefits of an ablation and she would like to also consider hormone replacement therapy her D&C was done in April she had negative pathology she had an ultrasound so she should be cleared to proceed with an ablation and then if the pathology is negative and she does well we can consider hormone replacement therapy I will look into getting her on the schedule as soon as possible I have reviewed her prior surgical op notes  and her prior ultrasound          LSH

## 2024-10-29 RX ORDER — NORETHINDRONE 5 MG/1
10 TABLET ORAL DAILY
Qty: 90 TABLET | Refills: 1 | Status: SHIPPED | OUTPATIENT
Start: 2024-10-29 | End: 2024-12-06 | Stop reason: ALTCHOICE

## 2024-12-06 ENCOUNTER — PRE-ADMISSION TESTING (OUTPATIENT)
Dept: PREADMISSION TESTING | Age: 54
End: 2024-12-06
Payer: COMMERCIAL

## 2024-12-06 VITALS — HEIGHT: 59 IN | WEIGHT: 135 LBS | BODY MASS INDEX: 27.21 KG/M2

## 2024-12-06 RX ORDER — FINASTERIDE 5 MG/1
5 TABLET, FILM COATED ORAL DAILY
COMMUNITY
Start: 2024-11-30

## 2024-12-06 NOTE — PRE-PROCEDURE INSTRUCTIONS
[facility]  [address] [place] [address]    1.       Admissions will call you with your arrival time on  December 11, 2024 (day prior to surgery) between 2pm-4pm.  For questions about your arrival time, please call [phone].    2.       On the day of your procedure please report to the [facility] in the [place]. Please arrive through the [place] Lobby Entrance.  If you are parking in the [facility], come to the ground floor of the garage and follow signs to the [facility].  After being screened, please report to either the Outpatient Registration for Pre-Admission Testing or to the Surgery Registration Desk.    3. Please follow the following fasting guidelines:     No solid food EIGHT HOURS prior to arrival time on day of surgery.  6 ounces of clear liquids, meaning water or PLAIN black coffee WITHOUT any milk, cream, sugar, or sweetener are permitted up to TWO HOURS prior to arrival at the hospital.    4. Please take ONLY the following medications with a sip of water on the morning of your procedure:No aspirin,motrin,aleve,ibuprofen,naproxen,NSAIDS,vitamins,herbal supplements 7 days before procedure. can take tylenol as needed for pain. Pt takes all prescription medications at night, will not be taking any medications on AM of procedure.    [facility]  Patient Education Preoperative Showers    Good hygiene, such as frequent handwashing and daily skin cleansing, promotes good health. Daily skin cleansing helps remove germs that may cause infections. The following instructions should be followed to help reduce germs on your skin prior to your surgical procedure.     Bactoshield/Hibiclens CHG 4% is an antiseptic soap. The active ingredient is chlorhexidine gluconate. Do not use this product if you are allergic to chlorhexidine gluconate.  The NIGHT before and the MORNING of your procedure, shower or bathe with Bactoshield. This product should replace your  regular soap used for cleansing most of your body surfaces. Bactoshield should not be used on your head or face; keep out of your eyes, ears and mouth.  If you plan to wash your hair, do so with your regular shampoo. Then, rinse the hair and your body thoroughly to remove any shampoo residue.  Wash your face with regular soap and water only.  Wash your genital area with soap and water only.  Thoroughly rinse your body with warm water from the neck down.  Apply the minimum amount of Bactoshield to cover the skin. Use this product as you would any liquid soap. Leave this on for 2 minutes. NOTE- Bactoshield DOES NOT LATHER like normal soap.   Wash the skin gently and rinse thoroughly with warm water. You do not need to scrub the skin to remove germs.  Do not use regular soap after you have applied and rinsed the Bactoshield.  Change into clean clothes after each shower.   Do not apply any lotions, powders, or perfumes to the body areas that have been cleansed with Bactoshield.  No use of hair removal products or shaving at or near the surgical site 48 hours before your procedure. (72 hours for cardiac patients.)  For those having perineal surgeries (i.e.: vaginal, rectal or cystoscopy) - please use Dial soap.    5. Other Instructions: You may brush your teeth the morning of the procedure. Rinse and spit, do not swallow.  Bring a list of your medications with dosages.  Use surgical wash as directed.     6. If you develop a cold, cough, fever, rash, or other symptom prior to the day of the procedure, please report it to your physician immediately.    7. If you need to cancel the procedure for any reason, please contact your physician.    8. Make arrangements to have safe transportation home accompanied by a responsible adult. If you have not arranged safe transportation home, your surgery will be cancelled. Safe transportation may include private vehicle, ride-share service, taxi and public transportation when  accompanied by a responsible adult who will assist you home. A responsible adult is someone known to you and does not include the taxi, ride-share or public transit drive transporting you.  will be .    9.  If it is medically necessary for you to have a longer stay, you will be informed as soon as the decision is made.    10. Only bring essential items to the hospital.  Do not wear or bring anything of value to the hospital including jewelry of any kind, money, or wallet. Do not wear make-up or contact lenses.  DO NOT BRING MEDICATIONS FROM HOME unless instructed to do so. DO bring your hearing aids, glasses, and a case    11. No lotion, creams, powders, or oils on skin the morning of procedure     12. Dress in comfortable clothes.    13.  If instructed, please bring a copy of your Advanced Directive (Living Will/Durable Power of ) on the day of your procedure.     14. Ensuring your safety at all times is a very important part of our [facility] Culture of Safety. After having surgery and sedation, you are at risk for falling and balance issues. Although you may feel awake, the effects of the medication can last up to 24 hours after anesthesia. If you need to use the bathroom during your recovery period, nursing staff will escort you there and stay with you to ensure your safety.    15. Refrain from drinking alcohol and smoking cigarettes for 24 hours prior to surgery.    16. Shower with antibacterial soap (Dial) the night before and morning of your procedure.  If your procedure indicates the need for CHG antiseptic wash (Bactoshield or Hibiclens), please use this instead and follow instructions as discussed at the time of your Pre-Admission Testing phone interview or visit.    Above instructions reviewed with patient and patient acknowledges understanding.    Form explained by: Brooke Cummings RN

## 2024-12-09 ENCOUNTER — HOSPITAL ENCOUNTER (OUTPATIENT)
Dept: CARDIOLOGY | Facility: HOSPITAL | Age: 54
Discharge: HOME | End: 2024-12-09
Attending: OBSTETRICS & GYNECOLOGY
Payer: COMMERCIAL

## 2024-12-09 ENCOUNTER — APPOINTMENT (OUTPATIENT)
Dept: LAB | Facility: HOSPITAL | Age: 54
End: 2024-12-09
Attending: OBSTETRICS & GYNECOLOGY
Payer: COMMERCIAL

## 2024-12-09 DIAGNOSIS — N93.9 ABNORMAL UTERINE BLEEDING (AUB): ICD-10-CM

## 2024-12-09 LAB
ABO + RH BLD: NORMAL
ANION GAP SERPL CALC-SCNC: 8 MEQ/L (ref 3–15)
BLD GP AB SCN SERPL QL: NEGATIVE
BLOOD BANK CMNT PATIENT-IMP: NORMAL
BUN SERPL-MCNC: 15 MG/DL (ref 7–25)
CALCIUM SERPL-MCNC: 9.5 MG/DL (ref 8.6–10.3)
CHLORIDE SERPL-SCNC: 105 MEQ/L (ref 98–107)
CO2 SERPL-SCNC: 25 MEQ/L (ref 21–31)
CREAT SERPL-MCNC: 0.9 MG/DL (ref 0.6–1.2)
D AG BLD QL: POSITIVE
EGFRCR SERPLBLD CKD-EPI 2021: >60 ML/MIN/1.73M*2
ERYTHROCYTE [DISTWIDTH] IN BLOOD BY AUTOMATED COUNT: 12.6 % (ref 11.7–14.4)
GLUCOSE SERPL-MCNC: 80 MG/DL (ref 70–99)
HCG SERPL-ACNC: <0.6 IU/L (MIU/ML)
HCT VFR BLD AUTO: 41.9 % (ref 35–45)
HGB BLD-MCNC: 13.9 G/DL (ref 11.8–15.7)
LABORATORY COMMENT REPORT: NORMAL
MCH RBC QN AUTO: 29 PG (ref 28–33.2)
MCHC RBC AUTO-ENTMCNC: 33.2 G/DL (ref 32.2–35.5)
MCV RBC AUTO: 87.5 FL (ref 83–98)
PLATELET # BLD AUTO: 337 K/UL (ref 150–369)
PMV BLD AUTO: 11 FL (ref 9.4–12.3)
POTASSIUM SERPL-SCNC: 4.2 MEQ/L (ref 3.5–5.1)
RBC # BLD AUTO: 4.79 M/UL (ref 3.93–5.22)
SODIUM SERPL-SCNC: 138 MEQ/L (ref 136–145)
SPECIMEN EXP DATE BLD: NORMAL
WBC # BLD AUTO: 6.18 K/UL (ref 3.8–10.5)

## 2024-12-09 PROCEDURE — 36415 COLL VENOUS BLD VENIPUNCTURE: CPT

## 2024-12-09 PROCEDURE — 84702 CHORIONIC GONADOTROPIN TEST: CPT

## 2024-12-09 PROCEDURE — 86901 BLOOD TYPING SEROLOGIC RH(D): CPT

## 2024-12-09 PROCEDURE — 80048 BASIC METABOLIC PNL TOTAL CA: CPT

## 2024-12-09 PROCEDURE — 85027 COMPLETE CBC AUTOMATED: CPT

## 2024-12-09 PROCEDURE — 93005 ELECTROCARDIOGRAM TRACING: CPT

## 2024-12-10 LAB
ATRIAL RATE: 68
P AXIS: 51
PR INTERVAL: 132
QRS DURATION: 74
QT INTERVAL: 396
QTC CALCULATION(BAZETT): 421
R AXIS: 57
T WAVE AXIS: 42
VENTRICULAR RATE: 68

## 2024-12-10 PROCEDURE — 93010 ELECTROCARDIOGRAM REPORT: CPT | Performed by: STUDENT IN AN ORGANIZED HEALTH CARE EDUCATION/TRAINING PROGRAM

## 2024-12-12 ENCOUNTER — ANESTHESIA (OUTPATIENT)
Dept: OPERATING ROOM | Facility: HOSPITAL | Age: 54
Setting detail: HOSPITAL OUTPATIENT SURGERY
End: 2024-12-12
Payer: COMMERCIAL

## 2024-12-12 ENCOUNTER — TELEPHONE (OUTPATIENT)
Dept: OBSTETRICS AND GYNECOLOGY | Facility: CLINIC | Age: 54
End: 2024-12-12
Payer: COMMERCIAL

## 2024-12-12 ENCOUNTER — HOSPITAL ENCOUNTER (OUTPATIENT)
Facility: HOSPITAL | Age: 54
Setting detail: HOSPITAL OUTPATIENT SURGERY
Discharge: HOME | End: 2024-12-12
Attending: OBSTETRICS & GYNECOLOGY | Admitting: OBSTETRICS & GYNECOLOGY
Payer: COMMERCIAL

## 2024-12-12 ENCOUNTER — PREP FOR CASE (OUTPATIENT)
Dept: OBSTETRICS AND GYNECOLOGY | Facility: CLINIC | Age: 54
End: 2024-12-12
Payer: COMMERCIAL

## 2024-12-12 VITALS
OXYGEN SATURATION: 95 % | HEART RATE: 75 BPM | TEMPERATURE: 97.3 F | SYSTOLIC BLOOD PRESSURE: 96 MMHG | DIASTOLIC BLOOD PRESSURE: 62 MMHG | RESPIRATION RATE: 16 BRPM

## 2024-12-12 DIAGNOSIS — N93.9 ABNORMAL UTERINE BLEEDING (AUB): Primary | ICD-10-CM

## 2024-12-12 DIAGNOSIS — N93.9 ABNORMAL UTERINE BLEEDING (AUB): ICD-10-CM

## 2024-12-12 PROCEDURE — 25000000 HC PHARMACY GENERAL: Performed by: NURSE ANESTHETIST, CERTIFIED REGISTERED

## 2024-12-12 PROCEDURE — 71000012 HC PACU PHASE 2 EA ADDL MIN: Performed by: OBSTETRICS & GYNECOLOGY

## 2024-12-12 PROCEDURE — 88305 TISSUE EXAM BY PATHOLOGIST: CPT | Performed by: OBSTETRICS & GYNECOLOGY

## 2024-12-12 PROCEDURE — 63700000 HC SELF-ADMINISTRABLE DRUG

## 2024-12-12 PROCEDURE — 36000003 HC OR LEVEL 3 INITIAL 30MIN: Performed by: OBSTETRICS & GYNECOLOGY

## 2024-12-12 PROCEDURE — 63600000 HC DRUGS/DETAIL CODE: Mod: JZ | Performed by: STUDENT IN AN ORGANIZED HEALTH CARE EDUCATION/TRAINING PROGRAM

## 2024-12-12 PROCEDURE — 36000013 HC OR LEVEL 3 EA ADDL MIN: Performed by: OBSTETRICS & GYNECOLOGY

## 2024-12-12 PROCEDURE — 0UDB8ZX EXTRACTION OF ENDOMETRIUM, VIA NATURAL OR ARTIFICIAL OPENING ENDOSCOPIC, DIAGNOSTIC: ICD-10-PCS | Performed by: OBSTETRICS & GYNECOLOGY

## 2024-12-12 PROCEDURE — 71000002 HC PACU PHASE 2 INITIAL 30MIN: Performed by: OBSTETRICS & GYNECOLOGY

## 2024-12-12 PROCEDURE — 37000001 HC ANESTHESIA GENERAL: Performed by: OBSTETRICS & GYNECOLOGY

## 2024-12-12 PROCEDURE — 63600000 HC DRUGS/DETAIL CODE: Mod: JZ | Performed by: NURSE ANESTHETIST, CERTIFIED REGISTERED

## 2024-12-12 PROCEDURE — 71000001 HC PACU PHASE 1 INITIAL 30MIN: Performed by: OBSTETRICS & GYNECOLOGY

## 2024-12-12 PROCEDURE — 25800000 HC PHARMACY IV SOLUTIONS: Performed by: NURSE ANESTHETIST, CERTIFIED REGISTERED

## 2024-12-12 PROCEDURE — 58563 HYSTEROSCOPY ABLATION: CPT | Mod: 52 | Performed by: OBSTETRICS & GYNECOLOGY

## 2024-12-12 PROCEDURE — 71000011 HC PACU PHASE 1 EA ADDL MIN: Performed by: OBSTETRICS & GYNECOLOGY

## 2024-12-12 RX ORDER — ONDANSETRON HYDROCHLORIDE 2 MG/ML
4 INJECTION, SOLUTION INTRAVENOUS
Status: DISCONTINUED | OUTPATIENT
Start: 2024-12-12 | End: 2024-12-12 | Stop reason: HOSPADM

## 2024-12-12 RX ORDER — ATROPINE SULFATE 0.4 MG/ML
0.2 INJECTION, SOLUTION ENDOTRACHEAL; INTRAMEDULLARY; INTRAMUSCULAR; INTRAVENOUS; SUBCUTANEOUS EVERY 5 MIN PRN
Status: DISCONTINUED | OUTPATIENT
Start: 2024-12-12 | End: 2024-12-12 | Stop reason: HOSPADM

## 2024-12-12 RX ORDER — IBUPROFEN 400 MG/1
400 TABLET ORAL EVERY 4 HOURS PRN
Status: DISCONTINUED | OUTPATIENT
Start: 2024-12-12 | End: 2024-12-12 | Stop reason: HOSPADM

## 2024-12-12 RX ORDER — ACETAMINOPHEN 325 MG/1
650 TABLET ORAL EVERY 4 HOURS PRN
Status: DISCONTINUED | OUTPATIENT
Start: 2024-12-12 | End: 2024-12-12 | Stop reason: HOSPADM

## 2024-12-12 RX ORDER — PHENYLEPHRINE HCL IN 0.9% NACL 1 MG/10 ML
SYRINGE (ML) INTRAVENOUS AS NEEDED
Status: DISCONTINUED | OUTPATIENT
Start: 2024-12-12 | End: 2024-12-12 | Stop reason: SURG

## 2024-12-12 RX ORDER — AMOXICILLIN 250 MG
1 CAPSULE ORAL NIGHTLY PRN
Status: DISCONTINUED | OUTPATIENT
Start: 2024-12-12 | End: 2024-12-12 | Stop reason: HOSPADM

## 2024-12-12 RX ORDER — ACETAMINOPHEN 325 MG/1
TABLET ORAL
Status: COMPLETED
Start: 2024-12-12 | End: 2024-12-12

## 2024-12-12 RX ORDER — ACETAMINOPHEN 325 MG/1
975 TABLET ORAL ONCE
Status: CANCELLED | OUTPATIENT
Start: 2024-12-12 | End: 2024-12-12

## 2024-12-12 RX ORDER — ONDANSETRON 4 MG/1
4 TABLET, ORALLY DISINTEGRATING ORAL EVERY 8 HOURS PRN
Status: DISCONTINUED | OUTPATIENT
Start: 2024-12-12 | End: 2024-12-12 | Stop reason: HOSPADM

## 2024-12-12 RX ORDER — IBUPROFEN 200 MG
16-32 TABLET ORAL AS NEEDED
Status: DISCONTINUED | OUTPATIENT
Start: 2024-12-12 | End: 2024-12-12 | Stop reason: HOSPADM

## 2024-12-12 RX ORDER — SODIUM CHLORIDE, SODIUM LACTATE, POTASSIUM CHLORIDE, CALCIUM CHLORIDE 600; 310; 30; 20 MG/100ML; MG/100ML; MG/100ML; MG/100ML
INJECTION, SOLUTION INTRAVENOUS CONTINUOUS
Status: DISCONTINUED | OUTPATIENT
Start: 2024-12-12 | End: 2024-12-12 | Stop reason: HOSPADM

## 2024-12-12 RX ORDER — NORETHINDRONE 5 MG/1
10 TABLET ORAL DAILY
Qty: 90 TABLET | Refills: 3 | Status: SHIPPED | OUTPATIENT
Start: 2024-12-12 | End: 2025-03-05

## 2024-12-12 RX ORDER — ACETAMINOPHEN 325 MG/1
975 TABLET ORAL ONCE
Status: COMPLETED | OUTPATIENT
Start: 2024-12-12 | End: 2024-12-12

## 2024-12-12 RX ORDER — MEPERIDINE HYDROCHLORIDE 25 MG/ML
12.5 INJECTION INTRAMUSCULAR; INTRAVENOUS; SUBCUTANEOUS EVERY 10 MIN PRN
Status: DISCONTINUED | OUTPATIENT
Start: 2024-12-12 | End: 2024-12-12 | Stop reason: HOSPADM

## 2024-12-12 RX ORDER — DEXAMETHASONE SODIUM PHOSPHATE 4 MG/ML
INJECTION, SOLUTION INTRA-ARTICULAR; INTRALESIONAL; INTRAMUSCULAR; INTRAVENOUS; SOFT TISSUE AS NEEDED
Status: DISCONTINUED | OUTPATIENT
Start: 2024-12-12 | End: 2024-12-12 | Stop reason: SURG

## 2024-12-12 RX ORDER — SODIUM CHLORIDE 9 MG/ML
INJECTION, SOLUTION INTRAVENOUS CONTINUOUS PRN
Status: DISCONTINUED | OUTPATIENT
Start: 2024-12-12 | End: 2024-12-12 | Stop reason: SURG

## 2024-12-12 RX ORDER — OXYCODONE HYDROCHLORIDE 5 MG/1
5 TABLET ORAL EVERY 4 HOURS PRN
Status: DISCONTINUED | OUTPATIENT
Start: 2024-12-12 | End: 2024-12-12 | Stop reason: HOSPADM

## 2024-12-12 RX ORDER — PROPOFOL 10 MG/ML
INJECTION, EMULSION INTRAVENOUS AS NEEDED
Status: DISCONTINUED | OUTPATIENT
Start: 2024-12-12 | End: 2024-12-12 | Stop reason: SURG

## 2024-12-12 RX ORDER — LABETALOL HCL 20 MG/4 ML
5 SYRINGE (ML) INTRAVENOUS AS NEEDED
Status: DISCONTINUED | OUTPATIENT
Start: 2024-12-12 | End: 2024-12-12 | Stop reason: HOSPADM

## 2024-12-12 RX ORDER — ONDANSETRON HYDROCHLORIDE 2 MG/ML
INJECTION, SOLUTION INTRAVENOUS AS NEEDED
Status: DISCONTINUED | OUTPATIENT
Start: 2024-12-12 | End: 2024-12-12 | Stop reason: SURG

## 2024-12-12 RX ORDER — DEXTROSE 50 % IN WATER (D50W) INTRAVENOUS SYRINGE
25 AS NEEDED
Status: DISCONTINUED | OUTPATIENT
Start: 2024-12-12 | End: 2024-12-12 | Stop reason: HOSPADM

## 2024-12-12 RX ORDER — FENTANYL CITRATE 50 UG/ML
50 INJECTION, SOLUTION INTRAMUSCULAR; INTRAVENOUS EVERY 5 MIN PRN
Status: DISCONTINUED | OUTPATIENT
Start: 2024-12-12 | End: 2024-12-12 | Stop reason: HOSPADM

## 2024-12-12 RX ORDER — HYDROMORPHONE HYDROCHLORIDE 1 MG/ML
0.5 INJECTION, SOLUTION INTRAMUSCULAR; INTRAVENOUS; SUBCUTANEOUS
Status: DISCONTINUED | OUTPATIENT
Start: 2024-12-12 | End: 2024-12-12 | Stop reason: HOSPADM

## 2024-12-12 RX ORDER — DEXTROSE 40 %
15-30 GEL (GRAM) ORAL AS NEEDED
Status: DISCONTINUED | OUTPATIENT
Start: 2024-12-12 | End: 2024-12-12 | Stop reason: HOSPADM

## 2024-12-12 RX ORDER — MIDAZOLAM HYDROCHLORIDE 2 MG/2ML
INJECTION, SOLUTION INTRAMUSCULAR; INTRAVENOUS AS NEEDED
Status: DISCONTINUED | OUTPATIENT
Start: 2024-12-12 | End: 2024-12-12 | Stop reason: SURG

## 2024-12-12 RX ORDER — SODIUM CHLORIDE, SODIUM LACTATE, POTASSIUM CHLORIDE, CALCIUM CHLORIDE 600; 310; 30; 20 MG/100ML; MG/100ML; MG/100ML; MG/100ML
INJECTION, SOLUTION INTRAVENOUS CONTINUOUS
Status: CANCELLED | OUTPATIENT
Start: 2024-12-12 | End: 2024-12-19

## 2024-12-12 RX ORDER — EPHEDRINE SULFATE/0.9% NACL/PF 50 MG/5 ML
SYRINGE (ML) INTRAVENOUS AS NEEDED
Status: DISCONTINUED | OUTPATIENT
Start: 2024-12-12 | End: 2024-12-12 | Stop reason: SURG

## 2024-12-12 RX ORDER — HYDRALAZINE HYDROCHLORIDE 20 MG/ML
5 INJECTION INTRAMUSCULAR; INTRAVENOUS
Status: DISCONTINUED | OUTPATIENT
Start: 2024-12-12 | End: 2024-12-12 | Stop reason: HOSPADM

## 2024-12-12 RX ORDER — IBUPROFEN 600 MG/1
600 TABLET ORAL 4 TIMES DAILY PRN
Qty: 90 TABLET | Refills: 0 | Status: SHIPPED | OUTPATIENT
Start: 2024-12-12 | End: 2025-01-11

## 2024-12-12 RX ORDER — MIDAZOLAM HYDROCHLORIDE 2 MG/2ML
1 INJECTION, SOLUTION INTRAMUSCULAR; INTRAVENOUS ONCE AS NEEDED
Status: DISCONTINUED | OUTPATIENT
Start: 2024-12-12 | End: 2024-12-12 | Stop reason: HOSPADM

## 2024-12-12 RX ORDER — FENTANYL CITRATE 50 UG/ML
INJECTION, SOLUTION INTRAMUSCULAR; INTRAVENOUS AS NEEDED
Status: DISCONTINUED | OUTPATIENT
Start: 2024-12-12 | End: 2024-12-12 | Stop reason: SURG

## 2024-12-12 RX ORDER — OXYCODONE HYDROCHLORIDE 5 MG/1
5 TABLET ORAL ONCE AS NEEDED
Status: DISCONTINUED | OUTPATIENT
Start: 2024-12-12 | End: 2024-12-12 | Stop reason: HOSPADM

## 2024-12-12 RX ORDER — KETOROLAC TROMETHAMINE 30 MG/ML
INJECTION, SOLUTION INTRAMUSCULAR; INTRAVENOUS AS NEEDED
Status: DISCONTINUED | OUTPATIENT
Start: 2024-12-12 | End: 2024-12-12 | Stop reason: SURG

## 2024-12-12 RX ORDER — LIDOCAINE HYDROCHLORIDE 10 MG/ML
INJECTION, SOLUTION EPIDURAL; INFILTRATION; INTRACAUDAL; PERINEURAL AS NEEDED
Status: DISCONTINUED | OUTPATIENT
Start: 2024-12-12 | End: 2024-12-12 | Stop reason: SURG

## 2024-12-12 RX ADMIN — FENTANYL CITRATE 50 MCG: 50 INJECTION, SOLUTION INTRAMUSCULAR; INTRAVENOUS at 08:59

## 2024-12-12 RX ADMIN — FENTANYL CITRATE 50 MCG: 50 INJECTION, SOLUTION INTRAMUSCULAR; INTRAVENOUS at 08:17

## 2024-12-12 RX ADMIN — PROPOFOL 200 MG: 10 INJECTION, EMULSION INTRAVENOUS at 07:30

## 2024-12-12 RX ADMIN — SODIUM CHLORIDE: 9 INJECTION, SOLUTION INTRAVENOUS at 07:28

## 2024-12-12 RX ADMIN — ONDANSETRON 4 MG: 2 INJECTION INTRAMUSCULAR; INTRAVENOUS at 07:54

## 2024-12-12 RX ADMIN — Medication 100 MCG: at 07:45

## 2024-12-12 RX ADMIN — ACETAMINOPHEN 975 MG: 325 TABLET ORAL at 06:31

## 2024-12-12 RX ADMIN — KETOROLAC TROMETHAMINE 30 MG: 30 INJECTION, SOLUTION INTRAMUSCULAR at 07:58

## 2024-12-12 RX ADMIN — FENTANYL CITRATE 50 MCG: 50 INJECTION INTRAMUSCULAR; INTRAVENOUS at 07:30

## 2024-12-12 RX ADMIN — Medication 10 MG: at 07:35

## 2024-12-12 RX ADMIN — Medication 10 MG: at 07:39

## 2024-12-12 RX ADMIN — Medication 10 ML: at 07:30

## 2024-12-12 RX ADMIN — MIDAZOLAM HYDROCHLORIDE 2 MG: 1 INJECTION, SOLUTION INTRAMUSCULAR; INTRAVENOUS at 07:28

## 2024-12-12 RX ADMIN — DEXAMETHASONE SODIUM PHOSPHATE 8 MG: 4 INJECTION, SOLUTION INTRA-ARTICULAR; INTRALESIONAL; INTRAMUSCULAR; INTRAVENOUS; SOFT TISSUE at 07:30

## 2024-12-12 RX ADMIN — Medication 10 MG: at 07:49

## 2024-12-12 RX ADMIN — FENTANYL CITRATE 50 MCG: 50 INJECTION INTRAMUSCULAR; INTRAVENOUS at 07:45

## 2024-12-12 RX ADMIN — Medication 100 MCG: at 07:37

## 2024-12-12 NOTE — ANESTHESIA POSTPROCEDURE EVALUATION
Patient: Randa Moreau    Procedure Summary       Date: 12/12/24 Room / Location: [facility] OR [address] [place] [address] OR [facility]    Anesthesia Start: 0728 Anesthesia Stop: 0811    Procedure: D&C, HYSTEROSCOPY, ATTEMPTED ENDOMETRIAL ABLATION NOVASURE (Uterus) Diagnosis:       Abnormal uterine bleeding (AUB)      (Abnormal uterine bleeding (AUB) [N93.9])    Surgeons: Daniel Perry MD Responsible Provider: Juliana Segovia DO    Anesthesia Type: general ASA Status: 2            Anesthesia Type: general  PACU Vitals  12/12/2024 0806 - 12/12/2024 0906        12/12/2024  0815 12/12/2024  0817 12/12/2024  0830 12/12/2024  0845    BP: 103/58 99/58 91/56 95/60    Temp: 36.1 °C (96.9 °F) -- -- --    Pulse: 95 93 82 74    Resp: 16 19 22 18    SpO2: 99 % 99 % 99 % 99 %                12/12/2024  0859 12/12/2024  0900          BP: -- 96/59      Temp: -- --      Pulse: 74 77      Resp: 20 21      SpO2: 96 % 97 %                Anesthesia Post Evaluation    Pain management: adequate  Mode of pain management: IV medication  Patient location during evaluation: PACU  Patient participation: complete - patient participated  Level of consciousness: awake and alert  Cardiovascular status: acceptable  Airway Patency: adequate  Respiratory status: acceptable  Hydration status: acceptable  Anesthetic complications: no

## 2024-12-12 NOTE — PERIOPERATIVE NURSING NOTE
Pt met all discharge criteria.  Pt AAOx4, states pain better at this time, tolerating po intake well.  Pt ambulated to bathroom and voided successfully.  Discharge instructions reviewed with patient and spouse, verbalized understanding.  IV removed per protocol.  Pt taken to main entrance to meet  to drive home.

## 2024-12-12 NOTE — DISCHARGE INSTRUCTIONS
"After Your Surgery Discharge Instructions    After Discharge Orders:  Please call our office at [phone] to make a follow up appointment in 2 weeks.     Pain Medications: If you feel like you need pain medications after your surgery, we would like you to take alternating doses of two Extra Strength Tylenol (500mg each tablet) and three Ibuprofen (200mg each tablet) when you are discharged home. Both of these medications can be purchased over the counter at most pharmacies and grocery stores. We suggest taking 600mg of Ibuprofen followed by 1000mg of Tylenol three hours later, and then continuing this pattern every three hours. As you become more comfortable you can extend the time duration between doses or stop one of the medications entirely. If this regimen does not control your pain, please call the office so we can re-evaluate your pain medications and get you more comfortable.      After your surgery - signs and symptoms to watch for:  You should wait at least 1day before resuming driving. You may resume driving when you feel able to control a vehicle. NO DRIVING while using narcotic (hydromorphone, dilaudid, oxycodone, Percocet, Tylenol #3, vicodin) pain medications.  Fever - Oral temperature greater than 100.4 degrees Fahrenheit  Foul-smelling vaginal discharge  Headache unrelieved by \"pain meds\"  Difficulty urinating  Difficulty breathing with or without chest pain  New calf pain especially if only on one side  Sudden, continuing increased vaginal bleeding with or without clots     What to do at home:  Resume activity gradually  No sex until OK'd by your Physician about 2 weeks  Take care of yourself by sleeping/resting as much as possible  Eat regular nutritious meals  Let someone else care for you and assist with housework as much as possible   Take pain medication as prescribed whenever you need them  Wear compression stockings if prescribed   To avoid/relieve constipation take stool softeners if " advised   Drink lots of water/fruit juices  Increase fiber in your diet

## 2024-12-12 NOTE — ANESTHESIA PROCEDURE NOTES
Airway  Urgency: elective    Start Time: 12/12/2024 7:32 AM  Airway not difficult    General Information and Staff    Patient location during procedure: OR  Anesthesiologist: Juliana Segovia DO  Resident/CRNA: Pippa Clifford CRNA  Performed: resident/CRNA   Performed by: Pippa Clifford CRNA  Authorized by: Juliana Segovia DO      Indications and Patient Condition  Indications for airway management: anesthesia  Sedation level: general  Preoxygenated: yes  Mask difficulty assessment: 1 - vent by mask    Final Airway Details  Final airway type: supraglottic airway      Successful airway: iGel  Size 3     Number of attempts at approach: 1  Number of other approaches attempted: 0  Atraumatic airway insertion

## 2024-12-12 NOTE — OP NOTE
D&C, HYSTEROSCOPY, ATTEMPTED ENDOMETRIAL ABLATION NOVASURE Procedure Note     Procedure:    D&C, HYSTEROSCOPY, ATTEMPTED ENDOMETRIAL ABLATION NOVASURE  CPT(R) Code:  18030 - [place] ENDOMETRIAL ABLATION, THERMAL    Indications: The patient has a history of aub. The patient now presents for surgery after discussing therapeutic alternatives.          Pre-op Diagnosis      * Abnormal uterine bleeding (AUB) [N93.9]    Post-op Diagnosis     * Abnormal uterine bleeding (AUB) [N93.9]    Surgeon: Daniel Perry MD     Assistants: rayshawn    Anesthesia: General endotracheal anesthesia    ASA Class: 2      Procedure Details   Patient was taken to the operating room and after achievement of adequate general anesthesia was prepped and draped in the usual sterile fashion.  The patient's bladder was emptied with a straight catheter.  A weighted speculum was placed into her vagina and the anterior portion of her cervix was grasped with a single-tooth tenaculum.  She was serially dilated in order to allow the hysteroscope to be placed.  Once placed the findings were as noted below.  The cervical measurement was 4 the uterine measurement was  4 the uterine width was 2 the power was ? and the total time was na. NovaSure ablation was incomplete the instruments were removed the hysteroscope was placed back in the cavity and the cavity appeared chart from the NovaSure device.  Once this was completed these instruments removed the sponges were counted and the instruments were counted and were correct.  Patient was stable.  Although her cavity appeared normal and we took nice curettings today multiple pictures were taken she had slightly heart-shaped uterus with a very long narrow ostia on the right and the NovaSure was attempted to be open 6 different times with different uterine lengths from 4-5.5 and the arms would not open more than 2 cm without opening the arms to at least 3 cm you cannot complete the ablation    Findings:  Narrow  endometrial cavity unable to perform ablation    Estimated Blood Loss:  No blood loss documented.           Drains: none           Total IV Fluids: 500ml           Specimens:   ID Type Source Tests Collected by Time Destination   1 : Endometrial currettings Tissue Endometrium PATHOLOGY TISSUE EXAM Daniel Perry MD 12/12/2024 1675               Complications:  none           Disposition: PACU - hemodynamically stable.           Condition: stable    Daniel Perry MD  Phone Number: [phone]    Daniel Perry MD

## 2024-12-12 NOTE — ANESTHESIA PREPROCEDURE EVALUATION
Relevant Problems   CARDIOVASCULAR   (+) HTN (hypertension)      Other   (+) Hypothyroidism       ROS/Med Hx     Past Surgical History   Procedure Laterality Date   •  section     • Cholecystectomy     • Colonoscopy     • D&C, HYSTEROSCOPY/RESECTOSCOPY MYOMECTOMY MYOSURE/TRUCLEAR N/A 4/10/2024    Performed by Sindy Raza DO at [facility] OR [facility]   • Dilation and curettage of uterus     • Tonsillectomy     • Upper gastrointestinal endoscopy         Physical Exam    Airway   Mallampati: II   TM distance: >3 FB   Neck ROM: full  Cardiovascular - normal   Rhythm: regular   Rate: normalPulmonary - normal   clear to auscultation  Dental - normal      Anesthesia Plan    Plan: general    Technique: general LMA     Lines and Monitors: PIV     Airway: oral intubation    2 ASA  Anesthetic plan and risks discussed with: patient  Induction:    intravenous   Postop Plan:   Patient Disposition: phase II then home   Pain Management: IV analgesics

## 2024-12-12 NOTE — ANESTHESIOLOGIST PRE-PROCEDURE ATTESTATION
Pre-Procedure Patient Identification:  I am the Primary Anesthesiologist and have identified the patient on 12/12/24 at 6:55 AM.   I have confirmed the procedure(s) will be performed by the following surgeon/proceduralist Daniel Perry MD.

## 2024-12-12 NOTE — H&P
Patient ID: Randa Moreau               : [birth year] y.o.  MRN: [id number]                          Visit Date: 10/22/2024     Subjective   Randa Moreau is presenting today for No chief complaint on file.        HPI  Patient is here for persistent vaginal bleeding although she had 3 months after her D&C with no.  She is clearly now perimenopausal and bleeding on and off uncontrollably for over 20 days intermittently she has a lot of traveling coming up and she would like to consider an ablation she is also suffering from perimenopausal symptoms hot flashes night sweats and mood swings        Past Medical History:  has a past medical history of Hypercholesteremia, Hypertension, Hypothyroidism, and PCOS (polycystic ovarian syndrome).      Past Surgical History:  has a past surgical history that includes  section; Tonsillectomy; Cholecystectomy; and Dilation and curettage of uterus.     Obstetric History:                    OB History    Para Term  AB Living   3 2 2   1 2   SAB IAB Ectopic Multiple Live Births      1       2          # Outcome Date GA Lbr Alon/2nd Weight Sex Type Anes PTL Lv   3 Term 05       F CS-LTranv     KATHIE   2 Term 04       M CS-LTranv     KATHIE   1 SAB                           Medications:   Current Outpatient Medications:     atorvastatin (LIPITOR) 10 mg tablet, Take 10 mg by mouth daily., Disp: , Rfl:     ferrous sulfate 325 mg (65 mg iron) tablet, Take 65 mg by mouth daily., Disp: , Rfl:     fsh/flx/prim/cur/bor/om3,6,9 5 (OMEGA 3-6-9 FATTY ACIDS ORAL), Take 1 tablet by mouth daily., Disp: , Rfl:     losartan (COZAAR) 25 mg tablet, Take 25 mg by mouth nightly., Disp: , Rfl:     magnesium oxide 500 mg capsule, Take 500 mg by mouth daily., Disp: , Rfl:     metoprolol succinate XL (TOPROL-XL) 50 mg 24 hr tablet, Take 50 mg by mouth nightly., Disp: , Rfl:     tirzepatide (MOUNJARO) 12.5 mg/0.5 mL pen injector, Inject 12.5 mg under the skin every (seven) 7  days. Sunday, Disp: , Rfl:        Allergies: is allergic to lisinopril and sulfa (sulfonamide antibiotics).         Vital Signs for this encounter: There were no vitals taken for this visit.     Physical Exam   Constitutional: She is oriented to person, place, and time. She appears well-developed and well-nourished.   HEENT: Head: Normocephalic and atraumatic.   Eyes: EOM are normal. Pupils are equal, round, and reactive to light.   Neck: Normal range of motion. No tracheal deviation present. No thyromegaly present.   Abdominal: Soft. Bowel sounds are normal. She exhibits no distension and no masses. There is no tenderness. There is no rebound and no guarding.   Musculoskeletal: Normal range of motion. She exhibits no edema.   Lymphadenopathy: She has no cervical adenopathy.   Neurological: She is alert and oriented to person, place, and time. No cranial nerve deficit.   Skin: Skin is warm and dry.   Psychiatric: She has a normal mood and affect.      BREAST: no masses or nodes palpated b/l     PELVIC:  Genitourinary: Vagina normal.   External female genitalia normal.   Normal bladder.   Vagina normal. No vaginal discharge found.   Cervix exam normal.Cervix does not exhibit motion tenderness or discharge. Uterus is normal size . Uterine contour is regular.   Adnexa normal. Right adnexum does not display tenderness, does not display fullness and palpable. Left adnexum does not display tenderness, does not display fullness and palpable.   Nursing note and vitals reviewed.     Impression/Plan:  Her exam is unremarkable and she should do well with an ablation we discussed the risks and benefits of an ablation and she would like to also consider hormone replacement therapy her D&C was done in April she had negative pathology she had an ultrasound so she should be cleared to proceed with an ablation and then if the pathology is negative and she does well we can consider hormone replacement therapy I will look into getting  her on the schedule as soon as possible I have reviewed her prior surgical op notes and her prior ultrasound  having ablation failed med tx               [facility]

## 2024-12-12 NOTE — H&P
Patient ID: Randa Moreau   : 1970 54 y.o.  MRN: [id number]   Visit Date: 10/22/2024    Subjective   Randa Moreau is presenting today for No chief complaint on file.      HPI  Patient is here for persistent vaginal bleeding although she had 3 months after her D&C with no.  She is clearly now perimenopausal and bleeding on and off uncontrollably for over 20 days intermittently she has a lot of traveling coming up and she would like to consider an ablation she is also suffering from perimenopausal symptoms hot flashes night sweats and mood swings      Past Medical History:  has a past medical history of Hypercholesteremia, Hypertension, Hypothyroidism, and PCOS (polycystic ovarian syndrome).     Past Surgical History:  has a past surgical history that includes  section; Tonsillectomy; Cholecystectomy; and Dilation and curettage of uterus.    Obstetric History:   OB History    Para Term  AB Living   3 2 2   1 2   SAB IAB Ectopic Multiple Live Births   1       2      # Outcome Date GA Lbr Alon/2nd Weight Sex Type Anes PTL Lv   3 Term 05    F CS-LTranv   KATHIE   2 Term 04    M CS-LTranv   KATHIE   1 SAB                Medications:   Current Outpatient Medications:     atorvastatin (LIPITOR) 10 mg tablet, Take 10 mg by mouth daily., Disp: , Rfl:     ferrous sulfate 325 mg (65 mg iron) tablet, Take 65 mg by mouth daily., Disp: , Rfl:     fsh/flx/prim/cur/bor/om3,6,9 5 (OMEGA 3-6-9 FATTY ACIDS ORAL), Take 1 tablet by mouth daily., Disp: , Rfl:     losartan (COZAAR) 25 mg tablet, Take 25 mg by mouth nightly., Disp: , Rfl:     magnesium oxide 500 mg capsule, Take 500 mg by mouth daily., Disp: , Rfl:     metoprolol succinate XL (TOPROL-XL) 50 mg 24 hr tablet, Take 50 mg by mouth nightly., Disp: , Rfl:     tirzepatide (MOUNJARO) 12.5 mg/0.5 mL pen injector, Inject 12.5 mg under the skin every (seven) 7 days. , Disp: , Rfl:       Allergies: is allergic to lisinopril and sulfa  (sulfonamide antibiotics).       Vital Signs for this encounter: There were no vitals taken for this visit.    Physical Exam   Constitutional: She is oriented to person, place, and time. She appears well-developed and well-nourished.   HEENT: Head: Normocephalic and atraumatic.   Eyes: EOM are normal. Pupils are equal, round, and reactive to light.   Neck: Normal range of motion. No tracheal deviation present. No thyromegaly present.   Abdominal: Soft. Bowel sounds are normal. She exhibits no distension and no masses. There is no tenderness. There is no rebound and no guarding.   Musculoskeletal: Normal range of motion. She exhibits no edema.   Lymphadenopathy: She has no cervical adenopathy.   Neurological: She is alert and oriented to person, place, and time. No cranial nerve deficit.   Skin: Skin is warm and dry.   Psychiatric: She has a normal mood and affect.     BREAST: no masses or nodes palpated b/l    PELVIC:  Genitourinary: Vagina normal.   External female genitalia normal.   Normal bladder.   Vagina normal. No vaginal discharge found.   Cervix exam normal.Cervix does not exhibit motion tenderness or discharge. Uterus is normal size . Uterine contour is regular.   Adnexa normal. Right adnexum does not display tenderness, does not display fullness and palpable. Left adnexum does not display tenderness, does not display fullness and palpable.   Nursing note and vitals reviewed.    Impression/Plan:  Her exam is unremarkable and she should do well with an ablation we discussed the risks and benefits of an ablation and she would like to also consider hormone replacement therapy her D&C was done in April she had negative pathology she had an ultrasound so she should be cleared to proceed with an ablation and then if the pathology is negative and she does well we can consider hormone replacement therapy I will look into getting her on the schedule as soon as possible I have reviewed her prior surgical op notes  and her prior ultrasound  The ablation failed as her cavity is heart shaped and narrow 8 cm in length and vag is adequate for min invasive surgery given h/o of cs x 2 will proceed with robotic hysterectomy  with dr rodriguez and myself.  Pt will try provera to control the bleeding until then   will confirm pathology from d and c ablation attempt prior to OR and get med clear   pt is off [facility]         [facility]

## 2024-12-12 NOTE — OR SURGEON
Pre-Procedure patient identification:  I am the primary operating surgeon/proceduralist and I have reviewed the applicable pathology reports and radiology studies for this procedure. I have identified the patient on 12/12/24 at 7:22 AM Daniel Perry MD  Phone Number: [phone]

## 2024-12-13 LAB
CASE RPRT: NORMAL
CLINICAL INFO: NORMAL
PATH REPORT.FINAL DX SPEC: NORMAL
PATH REPORT.GROSS SPEC: NORMAL

## 2025-01-10 RX ORDER — IBUPROFEN 600 MG/1
600 TABLET ORAL 4 TIMES DAILY PRN
Qty: 90 TABLET | Refills: 0 | OUTPATIENT
Start: 2025-01-10

## 2025-03-05 ENCOUNTER — TELEMEDICINE (OUTPATIENT)
Dept: OBSTETRICS AND GYNECOLOGY | Facility: CLINIC | Age: 55
End: 2025-03-05
Payer: COMMERCIAL

## 2025-03-05 DIAGNOSIS — N95.1 MENOPAUSAL SYMPTOMS: Primary | ICD-10-CM

## 2025-03-05 PROCEDURE — 99214 OFFICE O/P EST MOD 30 MIN: CPT | Mod: 95 | Performed by: OBSTETRICS & GYNECOLOGY

## 2025-03-05 RX ORDER — LEVONORGESTREL / ETHINYL ESTRADIOL AND ETHINYL ESTRADIOL 150-30(84)
1 KIT ORAL DAILY
Qty: 91 TABLET | Refills: 3 | Status: SHIPPED | OUTPATIENT
Start: 2025-03-05 | End: 2026-03-05

## 2025-03-05 NOTE — PROGRESS NOTES
Verification of Patient Location:  The patient affirms they are currently located in the following state: [place]     Request for Consent:  Audio and Video Encounter   Milka, my name is Daniel Perry MD OBGYTANYA.   Before we proceed, can you please verify your identification by telling me your full name and date of birth?  Can you tell me who is in the room with you?     You and I are about to have a telemedicine check-in or visit because you have requested it.  This is a live video-conference.  I am a real person, speaking to you in real time.  There is no one else with me on the video-conference.  However, when we use ([employer], [employer], etc) it is important for you to know that the video-conference may not be secure or private.  I am not recording this conversation and I am asking you not to record it.  This telemedicine visit will be billed to your health insurance or you, if you are self-insured.  You understand you will be responsible for any copayments or coinsurances that apply to your telemedicine visit.  Communication platform used for this encounter:  [email or web address] Video Visit (Epic Video Client)     Before starting our telemedicine visit, I am required to get your consent for this virtual check-in or visit by telemedicine. Do you consent?     Patient Response to Request for Consent:  Yes        Visit Documentation:    HPI: wants hrt  but having aub  eval has been neg  could not do ablation due to shape and narrowness of uterus   Her bleeding has been ok  still spots in between and provera not helping  will start seasonique and see if this helps mp sxs and btb        Appearance on video:      A/P:

## 2025-07-28 ENCOUNTER — OFFICE VISIT (OUTPATIENT)
Age: 55
End: 2025-07-28
Payer: COMMERCIAL

## 2025-07-28 ENCOUNTER — APPOINTMENT (OUTPATIENT)
Age: 55
End: 2025-07-28
Attending: PHYSICIAN ASSISTANT
Payer: COMMERCIAL

## 2025-07-28 VITALS — HEIGHT: 59 IN | BODY MASS INDEX: 27.42 KG/M2 | WEIGHT: 136 LBS

## 2025-07-28 DIAGNOSIS — M79.671 PAIN OF RIGHT FOOT: Primary | ICD-10-CM

## 2025-07-28 DIAGNOSIS — Z47.89 ENCOUNTER FOR OTHER ORTHOPEDIC AFTERCARE: ICD-10-CM

## 2025-07-28 PROCEDURE — 73630 X-RAY EXAM OF FOOT: CPT

## 2025-07-28 PROCEDURE — 99213 OFFICE O/P EST LOW 20 MIN: CPT | Performed by: PHYSICIAN ASSISTANT

## 2025-07-28 RX ORDER — LEVONORGESTREL AND ETHINYL ESTRADIOL AND ETHINYL ESTRADIOL 150-30(84)
1 KIT ORAL DAILY
COMMUNITY

## 2025-07-28 RX ORDER — TIRZEPATIDE 12.5 MG/.5ML
1 INJECTION, SOLUTION SUBCUTANEOUS
COMMUNITY
Start: 2025-06-19

## 2025-07-28 RX ORDER — LOSARTAN POTASSIUM 25 MG/1
25 TABLET ORAL DAILY
COMMUNITY
Start: 2025-05-23

## 2025-07-28 RX ORDER — METOPROLOL SUCCINATE 25 MG/1
25 TABLET, EXTENDED RELEASE ORAL DAILY
COMMUNITY
Start: 2025-05-02

## 2025-08-21 ENCOUNTER — HOSPITAL ENCOUNTER (OUTPATIENT)
Age: 55
Discharge: HOME | End: 2025-08-21
Attending: FAMILY MEDICINE
Payer: COMMERCIAL

## 2025-08-21 VITALS — BODY MASS INDEX: 27.47 KG/M2 | HEIGHT: 59 IN

## 2025-08-21 DIAGNOSIS — Z12.31 VISIT FOR SCREENING MAMMOGRAM: ICD-10-CM

## 2025-08-21 PROCEDURE — 77063 BREAST TOMOSYNTHESIS BI: CPT

## 2025-08-21 PROCEDURE — 77067 SCR MAMMO BI INCL CAD: CPT

## (undated) DEVICE — KIT HYSTEROSCOPIC PROCEDURE (TRUCLEAR)

## (undated) DEVICE — PACK LITHOTOMY PK III SIRUS 10/CS

## (undated) DEVICE — GAUZE 8X4 16 PLY RFID DOUBLE XRAY

## (undated) DEVICE — SOLN IRRIG .9%SOD 3L

## (undated) DEVICE — SEAL HYSTEROSCOPE ELITE

## (undated) DEVICE — DRESSING TELFA 3X4

## (undated) DEVICE — GAUZE 8X4 12 PLY RFID DOUBLE XRAY

## (undated) DEVICE — GOWN SIRUS FABRNF RAGLAN XL ST 28/CS

## (undated) DEVICE — DEVICE TRUCLEAR INCISOR 2.9

## (undated) DEVICE — GOWN SURGICAL REINFORCED LARGE

## (undated) DEVICE — TOWEL SURGICAL W17XL27IN BLUE COTTON STANDARD PREWASHED DELI

## (undated) DEVICE — TRAY WET SKIN PREP PREMIUM

## (undated) DEVICE — SOLN IRRIG STER WATER 500ML

## (undated) DEVICE — PAD PREPPING CUFFED 24X48 NS 100/CS

## (undated) DEVICE — DRAPE UNDERBUTTOCK GRAD POUCH PORT 20/CS

## (undated) DEVICE — COVER LIGHTHANDLE STERILE BLUE

## (undated) DEVICE — SOLN IV NSS 0.9% 500ML

## (undated) DEVICE — GLOVE SZ 7 PROTEXIS PI

## (undated) DEVICE — GLOVE SZ 7 LINER PROTEXIS PI BL

## (undated) DEVICE — COVER LIGHTHANDLE (STERILE SINGLE PA

## (undated) DEVICE — TUBE SUCTION 1/4INX20FT STERILE

## (undated) DEVICE — BLANKET UPPER BODY BAIR HUGGER

## (undated) DEVICE — DEVICE DISPOSABLE ADVANCED NOVASURE